# Patient Record
Sex: FEMALE | Race: BLACK OR AFRICAN AMERICAN | URBAN - METROPOLITAN AREA
[De-identification: names, ages, dates, MRNs, and addresses within clinical notes are randomized per-mention and may not be internally consistent; named-entity substitution may affect disease eponyms.]

---

## 2021-06-29 ENCOUNTER — OFFICE VISIT (OUTPATIENT)
Dept: URBAN - METROPOLITAN AREA TELEHEALTH 2 | Facility: TELEHEALTH | Age: 66
End: 2021-06-29

## 2021-11-09 ENCOUNTER — OFFICE VISIT (OUTPATIENT)
Dept: URBAN - METROPOLITAN AREA TELEHEALTH 2 | Facility: TELEHEALTH | Age: 66
End: 2021-11-09
Payer: MEDICARE

## 2021-11-09 DIAGNOSIS — R10.32 ABDOMINAL PAIN, LEFT LOWER QUADRANT: ICD-10-CM

## 2021-11-09 DIAGNOSIS — R10.84 ABDOMINAL CRAMPING, GENERALIZED: ICD-10-CM

## 2021-11-09 DIAGNOSIS — K57.32 DIVERTICULITIS OF COLON: ICD-10-CM

## 2021-11-09 PROCEDURE — 99213 OFFICE O/P EST LOW 20 MIN: CPT | Performed by: INTERNAL MEDICINE

## 2021-11-09 RX ORDER — FLUTICASONE PROPIONATE 50 UG/1
SPRAY, METERED NASAL
Qty: 0 | Refills: 0 | Status: ACTIVE | COMMUNITY
Start: 1900-01-01

## 2021-11-09 NOTE — HPI-TODAY'S VISIT:
Patient was last seen in our office on 4/30/2020.  She has had a history of recurrent diverticulitis.  She has had a history of multiple abdominal surgeries, with lysis of adhesions.  When last evaluated she was experiencing sudden onset of left lower quadrant abdominal pain.  She was evaluated in the emergency room, underwent abdominal pelvic CT scan showing an umbilical hernia, fatty liver diverticulosis without obvious inflammation.  Due to the persistence of the pain she was treated with a course of Cipro and Flagyl.  She underwent previous colonoscopy on 1/14/2019 which showed multiple sigmoid diverticuli, and was otherwise normal. Patient presented to her primary care physician on 11/1/2021 with left lower quadrant pain consistent with a flareup of her diverticulitis.  She was given a course of Cipro and Flagyl, and referred back to our practice for follow-up. Patient returns for follow-up on 11/9/2021.  She experienced a recent flareup of her diverticulitis, and was given a 10-day course of Cipro and Flagyl by her primary care physician.  She started feeling better after 3 days.  She is now feeling well with essentially no abdominal pain.  She does have mild constipation, and we advised her to use MiraLAX as needed.  She does drink plenty of fluids.  She wishes to avoid surgery if at all possible.  We discussed that since this was the first real flareup in over a year, avoiding surgery seems reasonable, especially as she does have significant adhesions due to previous multiple abdominal surgeries.  I advised her to avoid constipation, and to call us with the early onset of the symptoms, so antibiotic therapy can be started in a timely fashion.  As she had her last colonoscopy on 1/4/2019, showing multiple sigmoid diverticuli, we do not need to repeat colonoscopy at this time.

## 2022-08-10 ENCOUNTER — TELEPHONE ENCOUNTER (OUTPATIENT)
Dept: URBAN - METROPOLITAN AREA CLINIC 40 | Facility: CLINIC | Age: 67
End: 2022-08-10

## 2022-08-10 RX ORDER — CIPROFLOXACIN HYDROCHLORIDE 500 MG/1
TAKE 1 TABLET (500 MG) BY ORAL ROUTE EVERY 12 HOURS FOR 10 DAYS TABLET, FILM COATED ORAL 2
Qty: 20 | Refills: 1
Start: 2020-04-30

## 2022-08-10 RX ORDER — METRONIDAZOLE 500 MG/1
TAKE 1 TABLET (500 MG) BY ORAL ROUTE 3 TIMES PER DAY FOR 10 DAYS TABLET ORAL
Qty: 30 | Refills: 1
Start: 2020-04-30

## 2022-08-30 ENCOUNTER — OFFICE VISIT (OUTPATIENT)
Dept: URBAN - METROPOLITAN AREA CLINIC 40 | Facility: CLINIC | Age: 67
End: 2022-08-30
Payer: MEDICARE

## 2022-08-30 VITALS
BODY MASS INDEX: 34.66 KG/M2 | SYSTOLIC BLOOD PRESSURE: 135 MMHG | DIASTOLIC BLOOD PRESSURE: 80 MMHG | HEIGHT: 64 IN | HEART RATE: 88 BPM | TEMPERATURE: 97.6 F | OXYGEN SATURATION: 98 % | WEIGHT: 203 LBS

## 2022-08-30 DIAGNOSIS — K57.32 DIVERTICULITIS OF COLON: ICD-10-CM

## 2022-08-30 DIAGNOSIS — K21.9 GASTROESOPHAGEAL REFLUX DISEASE, UNSPECIFIED WHETHER ESOPHAGITIS PRESENT: ICD-10-CM

## 2022-08-30 DIAGNOSIS — R10.9 ABDOMINAL PAIN: ICD-10-CM

## 2022-08-30 DIAGNOSIS — K59.01 SLOW TRANSIT CONSTIPATION: ICD-10-CM

## 2022-08-30 DIAGNOSIS — R10.32 ABDOMINAL PAIN, LEFT LOWER QUADRANT: ICD-10-CM

## 2022-08-30 PROCEDURE — 99214 OFFICE O/P EST MOD 30 MIN: CPT | Performed by: INTERNAL MEDICINE

## 2022-08-30 RX ORDER — HYDROCHLOROTHIAZIDE 25 MG/1
1 TABLET IN THE MORNING TABLET ORAL ONCE A DAY
Status: ACTIVE | COMMUNITY

## 2022-08-30 RX ORDER — FAMOTIDINE 40 MG/1
1 TABLET AT BEDTIME TABLET, FILM COATED ORAL ONCE A DAY
Status: ACTIVE | COMMUNITY

## 2022-08-30 RX ORDER — METRONIDAZOLE 500 MG/1
TAKE 1 TABLET (500 MG) BY ORAL ROUTE 3 TIMES PER DAY FOR 10 DAYS TABLET ORAL
Qty: 30 | Refills: 1 | Status: ACTIVE | COMMUNITY
Start: 2020-04-30

## 2022-08-30 RX ORDER — PROMETHAZINE HYDROCHLORIDE 25 MG/ML
1 ML AS NEEDED INJECTION INTRAMUSCULAR; INTRAVENOUS
Status: ACTIVE | COMMUNITY

## 2022-08-30 RX ORDER — FLUTICASONE PROPIONATE 50 UG/1
SPRAY, METERED NASAL
Qty: 0 | Refills: 0 | Status: ACTIVE | COMMUNITY
Start: 1900-01-01

## 2022-08-30 RX ORDER — TRAZODONE HYDROCHLORIDE 100 MG/1
1 TABLET AT BEDTIME TABLET ORAL ONCE A DAY
Status: ACTIVE | COMMUNITY

## 2022-08-30 RX ORDER — CETIRIZINE HYDROCHLORIDE 10 MG/1
1 TABLET TABLET, FILM COATED ORAL ONCE A DAY
Status: ACTIVE | COMMUNITY

## 2022-08-30 RX ORDER — LOSARTAN POTASSIUM 100 MG/1
1 TABLET TABLET ORAL ONCE A DAY
Status: ACTIVE | COMMUNITY

## 2022-08-30 RX ORDER — ESZOPICLONE 2 MG/1
1 TABLET IMMEDIATELY BEFORE BEDTIME TABLET, FILM COATED ORAL ONCE A DAY
Status: ACTIVE | COMMUNITY

## 2022-08-30 RX ORDER — CIPROFLOXACIN HYDROCHLORIDE 500 MG/1
TAKE 1 TABLET (500 MG) BY ORAL ROUTE EVERY 12 HOURS FOR 10 DAYS TABLET, FILM COATED ORAL 2
Qty: 20 | Refills: 1 | Status: ACTIVE | COMMUNITY
Start: 2020-04-30

## 2022-08-30 RX ORDER — IBUPROFEN 800 MG/1
1 TABLET WITH FOOD OR MILK AS NEEDED TABLET, FILM COATED ORAL
Status: ACTIVE | COMMUNITY

## 2022-08-30 NOTE — PHYSICAL EXAM CONSTITUTIONAL:
well developed, well nourished , in no acute distress , ambulating without difficulty , normal communication ability [phone]

## 2022-08-30 NOTE — HPI-TODAY'S VISIT:
Patient was last seen in our office on 4/30/2020.  She has had a history of recurrent diverticulitis.  She has had a history of multiple abdominal surgeries, with lysis of adhesions.  When last evaluated she was experiencing sudden onset of left lower quadrant abdominal pain.  She was evaluated in the emergency room, underwent abdominal pelvic CT scan showing an umbilical hernia, fatty liver diverticulosis without obvious inflammation.  Due to the persistence of the pain she was treated with a course of Cipro and Flagyl.  She underwent previous colonoscopy on 1/14/2019 which showed multiple sigmoid diverticuli, and was otherwise normal. Patient presented to her primary care physician on 11/1/2021 with left lower quadrant pain consistent with a flareup of her diverticulitis.  She was given a course of Cipro and Flagyl, and referred back to our practice for follow-up. Patient returns for follow-up on 11/9/2021.  She experienced a recent flareup of her diverticulitis, and was given a 10-day course of Cipro and Flagyl by her primary care physician.  She started feeling better after 3 days.  She is now feeling well with essentially no abdominal pain.  She does have mild constipation, and we advised her to use MiraLAX as needed.  She does drink plenty of fluids.  She wishes to avoid surgery if at all possible.  We discussed that since this was the first real flareup in over a year, avoiding surgery seems reasonable, especially as she does have significant adhesions due to previous multiple abdominal surgeries.  I advised her to avoid constipation, and to call us with the early onset of the symptoms, so antibiotic therapy can be started in a timely fashion.  As she had her last colonoscopy on 1/4/2019, showing multiple sigmoid diverticuli, we do not need to repeat colonoscopy at this time. Patient has had a recent flareup of diverticulitis, and underwent abdominal pelvic CT scan on 8/12/2022 findings are consistent with an acute diverticulitis involving the distal descending colon, proximal sigmoid colon Patient returns for follow-up on 8/30/2022.  Several weeks ago she experienced another flareup of diverticulitis with left lower quadrant abdominal pain.  She underwent abdominal pelvic CT scan which was consistent with an acute diverticulitis involving the distal descending colon and proximal sigmoid colon.  She was given a 10-day course of Cipro and Flagyl, and her symptoms have completely resolved.  She is having no further abdominal pain, and her bowel movements are now normal.  She denies any overt bleeding, has had no unexplained weight loss.  She believes she was mildly constipated prior to this episode, and I again emphasized that she would benefit by beginning MiraLAX when she notices the onset of even a mild constipation.  She does drink plenty of fluids.  She has had mild gastroesophageal reflux, and was begun on famotidine 40 mg p.o. nightly by Dr. Fox.  Her symptoms have improved, and she has no swallowing issues.

## 2022-11-02 ENCOUNTER — P2P PATIENT RECORD (OUTPATIENT)
Age: 67
End: 2022-11-02

## 2022-11-10 ENCOUNTER — LAB OUTSIDE AN ENCOUNTER (OUTPATIENT)
Dept: URBAN - METROPOLITAN AREA CLINIC 40 | Facility: CLINIC | Age: 67
End: 2022-11-10

## 2022-11-10 ENCOUNTER — OFFICE VISIT (OUTPATIENT)
Dept: URBAN - METROPOLITAN AREA CLINIC 40 | Facility: CLINIC | Age: 67
End: 2022-11-10
Payer: MEDICARE

## 2022-11-10 VITALS
BODY MASS INDEX: 34.31 KG/M2 | HEART RATE: 78 BPM | HEIGHT: 64 IN | SYSTOLIC BLOOD PRESSURE: 128 MMHG | DIASTOLIC BLOOD PRESSURE: 78 MMHG | TEMPERATURE: 97.3 F | WEIGHT: 201 LBS

## 2022-11-10 DIAGNOSIS — R10.9 ABDOMINAL PAIN: ICD-10-CM

## 2022-11-10 DIAGNOSIS — R10.32 ABDOMINAL PAIN, LEFT LOWER QUADRANT: ICD-10-CM

## 2022-11-10 DIAGNOSIS — K21.9 GASTROESOPHAGEAL REFLUX DISEASE, UNSPECIFIED WHETHER ESOPHAGITIS PRESENT: ICD-10-CM

## 2022-11-10 DIAGNOSIS — K57.32 DIVERTICULITIS OF COLON: ICD-10-CM

## 2022-11-10 DIAGNOSIS — K59.01 SLOW TRANSIT CONSTIPATION: ICD-10-CM

## 2022-11-10 PROCEDURE — 99214 OFFICE O/P EST MOD 30 MIN: CPT | Performed by: INTERNAL MEDICINE

## 2022-11-10 RX ORDER — FAMOTIDINE 40 MG/1
1 TABLET AT BEDTIME TABLET, FILM COATED ORAL ONCE A DAY
Status: ACTIVE | COMMUNITY

## 2022-11-10 RX ORDER — HYDROCHLOROTHIAZIDE 25 MG/1
1 TABLET IN THE MORNING TABLET ORAL ONCE A DAY
Status: ACTIVE | COMMUNITY

## 2022-11-10 RX ORDER — DICYCLOMINE HYDROCHLORIDE 20 MG/1
1 TABLET TABLET ORAL THREE TIMES A DAY
Qty: 270 TABLET | Refills: 3 | OUTPATIENT

## 2022-11-10 RX ORDER — PROMETHAZINE HYDROCHLORIDE 25 MG/ML
1 ML AS NEEDED INJECTION INTRAMUSCULAR; INTRAVENOUS
Status: ACTIVE | COMMUNITY

## 2022-11-10 RX ORDER — METRONIDAZOLE 500 MG/1
TAKE 1 TABLET (500 MG) BY ORAL ROUTE 3 TIMES PER DAY FOR 10 DAYS TABLET ORAL
Qty: 30 | Refills: 1 | Status: ACTIVE | COMMUNITY
Start: 2020-04-30

## 2022-11-10 RX ORDER — ESZOPICLONE 2 MG/1
1 TABLET IMMEDIATELY BEFORE BEDTIME TABLET, FILM COATED ORAL ONCE A DAY
Status: ACTIVE | COMMUNITY

## 2022-11-10 RX ORDER — TRAZODONE HYDROCHLORIDE 100 MG/1
1 TABLET AT BEDTIME TABLET ORAL ONCE A DAY
Status: ACTIVE | COMMUNITY

## 2022-11-10 RX ORDER — CIPROFLOXACIN HYDROCHLORIDE 500 MG/1
TAKE 1 TABLET (500 MG) BY ORAL ROUTE EVERY 12 HOURS FOR 10 DAYS TABLET, FILM COATED ORAL 2
Qty: 20 | Refills: 1 | Status: ACTIVE | COMMUNITY
Start: 2020-04-30

## 2022-11-10 RX ORDER — FLUTICASONE PROPIONATE 50 UG/1
SPRAY, METERED NASAL
Qty: 0 | Refills: 0 | Status: ACTIVE | COMMUNITY
Start: 1900-01-01

## 2022-11-10 RX ORDER — LOSARTAN POTASSIUM 100 MG/1
1 TABLET TABLET ORAL ONCE A DAY
Status: ACTIVE | COMMUNITY

## 2022-11-10 RX ORDER — IBUPROFEN 800 MG/1
1 TABLET WITH FOOD OR MILK AS NEEDED TABLET, FILM COATED ORAL
Status: ACTIVE | COMMUNITY

## 2022-11-10 RX ORDER — CETIRIZINE HYDROCHLORIDE 10 MG/1
1 TABLET TABLET, FILM COATED ORAL ONCE A DAY
Status: ACTIVE | COMMUNITY

## 2022-11-10 NOTE — PHYSICAL EXAM GASTROINTESTINAL
Abdomen , soft,  mildly tender, LLQ,  nondistended , no guarding or rigidity , no masses palpable , normal bowel sounds , Liver and Spleen,  no hepatosplenomegaly , liver nontender

## 2023-01-08 PROBLEM — 35298007: Status: ACTIVE | Noted: 2022-08-30

## 2023-01-08 PROBLEM — 235595009: Status: ACTIVE | Noted: 2022-08-30

## 2023-01-12 ENCOUNTER — OFFICE VISIT (OUTPATIENT)
Dept: URBAN - METROPOLITAN AREA CLINIC 40 | Facility: CLINIC | Age: 68
End: 2023-01-12
Payer: MEDICARE

## 2023-01-12 VITALS
TEMPERATURE: 97.3 F | SYSTOLIC BLOOD PRESSURE: 122 MMHG | DIASTOLIC BLOOD PRESSURE: 82 MMHG | HEART RATE: 78 BPM | BODY MASS INDEX: 34.83 KG/M2 | WEIGHT: 204 LBS | HEIGHT: 64 IN

## 2023-01-12 DIAGNOSIS — K59.01 SLOW TRANSIT CONSTIPATION: ICD-10-CM

## 2023-01-12 DIAGNOSIS — R10.32 ABDOMINAL PAIN, LEFT LOWER QUADRANT: ICD-10-CM

## 2023-01-12 DIAGNOSIS — K57.32 DIVERTICULITIS OF COLON: ICD-10-CM

## 2023-01-12 DIAGNOSIS — K21.9 GASTROESOPHAGEAL REFLUX DISEASE, UNSPECIFIED WHETHER ESOPHAGITIS PRESENT: ICD-10-CM

## 2023-01-12 DIAGNOSIS — R10.9 ABDOMINAL PAIN: ICD-10-CM

## 2023-01-12 PROCEDURE — 99214 OFFICE O/P EST MOD 30 MIN: CPT | Performed by: INTERNAL MEDICINE

## 2023-01-12 RX ORDER — METRONIDAZOLE 500 MG/1
TAKE 1 TABLET (500 MG) BY ORAL ROUTE 3 TIMES PER DAY FOR 10 DAYS TABLET ORAL
Qty: 30 | Refills: 1 | Status: ACTIVE | COMMUNITY
Start: 2020-04-30

## 2023-01-12 RX ORDER — PROMETHAZINE HYDROCHLORIDE 25 MG/ML
1 ML AS NEEDED INJECTION INTRAMUSCULAR; INTRAVENOUS
Status: ACTIVE | COMMUNITY

## 2023-01-12 RX ORDER — HYDROCHLOROTHIAZIDE 25 MG/1
1 TABLET IN THE MORNING TABLET ORAL ONCE A DAY
Status: ACTIVE | COMMUNITY

## 2023-01-12 RX ORDER — FAMOTIDINE 40 MG/1
1 TABLET AT BEDTIME TABLET, FILM COATED ORAL ONCE A DAY
Qty: 90 TABLET | Refills: 3

## 2023-01-12 RX ORDER — IBUPROFEN 800 MG/1
1 TABLET WITH FOOD OR MILK AS NEEDED TABLET, FILM COATED ORAL
Status: ACTIVE | COMMUNITY

## 2023-01-12 RX ORDER — CIPROFLOXACIN HYDROCHLORIDE 500 MG/1
TAKE 1 TABLET (500 MG) BY ORAL ROUTE EVERY 12 HOURS FOR 10 DAYS TABLET, FILM COATED ORAL 2
Qty: 20 | Refills: 1 | Status: ACTIVE | COMMUNITY
Start: 2020-04-30

## 2023-01-12 RX ORDER — TRAZODONE HYDROCHLORIDE 100 MG/1
1 TABLET AT BEDTIME TABLET ORAL ONCE A DAY
Status: ACTIVE | COMMUNITY

## 2023-01-12 RX ORDER — CETIRIZINE HYDROCHLORIDE 10 MG/1
1 TABLET TABLET, FILM COATED ORAL ONCE A DAY
Status: ACTIVE | COMMUNITY

## 2023-01-12 RX ORDER — DICYCLOMINE HYDROCHLORIDE 20 MG/1
1 TABLET TABLET ORAL THREE TIMES A DAY
Qty: 270 TABLET | Refills: 3 | Status: ACTIVE | COMMUNITY

## 2023-01-12 RX ORDER — FAMOTIDINE 40 MG/1
1 TABLET AT BEDTIME TABLET, FILM COATED ORAL ONCE A DAY
Status: ACTIVE | COMMUNITY

## 2023-01-12 RX ORDER — LOSARTAN POTASSIUM 100 MG/1
1 TABLET TABLET ORAL ONCE A DAY
Status: ACTIVE | COMMUNITY

## 2023-01-12 RX ORDER — DICYCLOMINE HYDROCHLORIDE 20 MG/1
1 TABLET TABLET ORAL THREE TIMES A DAY
Qty: 270 TABLET | Refills: 3 | OUTPATIENT

## 2023-01-12 RX ORDER — ESZOPICLONE 2 MG/1
1 TABLET IMMEDIATELY BEFORE BEDTIME TABLET, FILM COATED ORAL ONCE A DAY
Status: ACTIVE | COMMUNITY

## 2023-01-12 RX ORDER — FLUTICASONE PROPIONATE 50 UG/1
SPRAY, METERED NASAL
Qty: 0 | Refills: 0 | Status: ACTIVE | COMMUNITY
Start: 1900-01-01

## 2023-01-12 NOTE — HPI-TODAY'S VISIT:
Patient was last seen in our office on 4/30/2020.  She has had a history of recurrent diverticulitis.  She has had a history of multiple abdominal surgeries, with lysis of adhesions.  When last evaluated she was experiencing sudden onset of left lower quadrant abdominal pain.  She was evaluated in the emergency room, underwent abdominal pelvic CT scan showing an umbilical hernia, fatty liver diverticulosis without obvious inflammation.  Due to the persistence of the pain she was treated with a course of Cipro and Flagyl.  She underwent previous colonoscopy on 1/14/2019 which showed multiple sigmoid diverticuli, and was otherwise normal. Patient presented to her primary care physician on 11/1/2021 with left lower quadrant pain consistent with a flareup of her diverticulitis.  She was given a course of Cipro and Flagyl, and referred back to our practice for follow-up. Patient returns for follow-up on 11/9/2021.  She experienced a recent flareup of her diverticulitis, and was given a 10-day course of Cipro and Flagyl by her primary care physician.  She started feeling better after 3 days.  She is now feeling well with essentially no abdominal pain.  She does have mild constipation, and we advised her to use MiraLAX as needed.  She does drink plenty of fluids.  She wishes to avoid surgery if at all possible.  We discussed that since this was the first real flareup in over a year, avoiding surgery seems reasonable, especially as she does have significant adhesions due to previous multiple abdominal surgeries.  I advised her to avoid constipation, and to call us with the early onset of the symptoms, so antibiotic therapy can be started in a timely fashion.  As she had her last colonoscopy on 1/4/2019, showing multiple sigmoid diverticuli, we do not need to repeat colonoscopy at this time. Patient has had a recent flareup of diverticulitis, and underwent abdominal pelvic CT scan on 8/12/2022 findings are consistent with an acute diverticulitis involving the distal descending colon, proximal sigmoid colon Patient returns for follow-up on 8/30/2022.  Several weeks ago she experienced another flareup of diverticulitis with left lower quadrant abdominal pain.  She underwent abdominal pelvic CT scan which was consistent with an acute diverticulitis involving the distal descending colon and proximal sigmoid colon.  She was given a 10-day course of Cipro and Flagyl, and her symptoms have completely resolved.  She is having no further abdominal pain, and her bowel movements are now normal.  She denies any overt bleeding, has had no unexplained weight loss.  She believes she was mildly constipated prior to this episode, and I again emphasized that she would benefit by beginning MiraLAX when she notices the onset of even a mild constipation.  She does drink plenty of fluids.  She has had mild gastroesophageal reflux, and was begun on famotidine 40 mg p.o. nightly by Dr. Fox.  Her symptoms have improved, and she has no swallowing issues. Patient returns for follow-up on 11/10/2022.  She states that she has had another recent flareup of diverticulitis with left lower quadrant pain and loose stools.  She was placed on Cipro and Flagyl by her primary care physician, and states that her bowel movements are more normal, but her left lower quadrant pain has persisted.  In addition she has had a flareup of her reflux.  Pantoprazole 40 mg p.o. every morning was added to her regimen which included famotidine 40 mg at bedtime.  She has been elevating the head of her bed.  She states that her reflux has also persisted, she is having no swallowing issues.  She denies any overt bleeding, has had no unexplained weight loss. Patient underwent lab work on 12/12/2022 which included CBC with hematocrit 35.9 hemoglobin 11.1 WBC 8.4 and normal iron studies.  She underwent abdominal pelvic CT scan on 12/20/2022 which showed extensive diverticulosis, but resolution of the previously seen acute diverticulitis. Patient returns for follow-up on 1/12/2023.  Overall she is doing well from a GI standpoint, and having no significant abdominal pain at this time.  We reviewed the results of her recent abdominal pelvic CT scan, which showed extensive diverticulosis, but resolution of the previously seen acute diverticulitis.  Her bowel movements have been essentially normal.  Her reflux has been under good control on daily famotidine therapy, she is discontinued the pantoprazole.  She is taking Benefiber.  She is having no swallowing issues.  She did eat a bowl of blueberries which cause mild abdominal discomfort, but this has resolved.  We again discussed in detail our goal of medically treating her diverticulitis, as she would be a difficult surgical candidate with extensive diverticulosis, and significant adhesions due to previous multiple abdominal surgeries.

## 2023-07-13 ENCOUNTER — OFFICE VISIT (OUTPATIENT)
Dept: URBAN - METROPOLITAN AREA CLINIC 40 | Facility: CLINIC | Age: 68
End: 2023-07-13

## 2023-07-13 RX ORDER — FLUTICASONE PROPIONATE 50 UG/1
SPRAY, METERED NASAL
Qty: 0 | Refills: 0 | Status: ACTIVE | COMMUNITY
Start: 1900-01-01

## 2023-07-13 RX ORDER — DICYCLOMINE HYDROCHLORIDE 20 MG/1
1 TABLET TABLET ORAL THREE TIMES A DAY
Qty: 270 TABLET | Refills: 3 | OUTPATIENT

## 2023-07-13 RX ORDER — FAMOTIDINE 40 MG/1
1 TABLET AT BEDTIME TABLET, FILM COATED ORAL ONCE A DAY
Qty: 90 TABLET | Refills: 3 | Status: ACTIVE | COMMUNITY

## 2023-07-13 RX ORDER — HYDROCHLOROTHIAZIDE 25 MG/1
1 TABLET IN THE MORNING TABLET ORAL ONCE A DAY
Status: ACTIVE | COMMUNITY

## 2023-07-13 RX ORDER — CETIRIZINE HYDROCHLORIDE 10 MG/1
1 TABLET TABLET, FILM COATED ORAL ONCE A DAY
Status: ACTIVE | COMMUNITY

## 2023-07-13 RX ORDER — IBUPROFEN 800 MG/1
1 TABLET WITH FOOD OR MILK AS NEEDED TABLET, FILM COATED ORAL
Status: ACTIVE | COMMUNITY

## 2023-07-13 RX ORDER — DICYCLOMINE HYDROCHLORIDE 20 MG/1
1 TABLET TABLET ORAL THREE TIMES A DAY
Qty: 270 TABLET | Refills: 3 | Status: ACTIVE | COMMUNITY

## 2023-07-13 RX ORDER — ESZOPICLONE 2 MG/1
1 TABLET IMMEDIATELY BEFORE BEDTIME TABLET, FILM COATED ORAL ONCE A DAY
Status: ACTIVE | COMMUNITY

## 2023-07-13 RX ORDER — CIPROFLOXACIN HYDROCHLORIDE 500 MG/1
TAKE 1 TABLET (500 MG) BY ORAL ROUTE EVERY 12 HOURS FOR 10 DAYS TABLET, FILM COATED ORAL 2
Qty: 20 | Refills: 1 | Status: ACTIVE | COMMUNITY
Start: 2020-04-30

## 2023-07-13 RX ORDER — LOSARTAN POTASSIUM 100 MG/1
1 TABLET TABLET ORAL ONCE A DAY
Status: ACTIVE | COMMUNITY

## 2023-07-13 RX ORDER — PROMETHAZINE HYDROCHLORIDE 25 MG/ML
1 ML AS NEEDED INJECTION INTRAMUSCULAR; INTRAVENOUS
Status: ACTIVE | COMMUNITY

## 2023-07-13 RX ORDER — METRONIDAZOLE 500 MG/1
TAKE 1 TABLET (500 MG) BY ORAL ROUTE 3 TIMES PER DAY FOR 10 DAYS TABLET ORAL
Qty: 30 | Refills: 1 | Status: ACTIVE | COMMUNITY
Start: 2020-04-30

## 2023-07-13 RX ORDER — TRAZODONE HYDROCHLORIDE 100 MG/1
1 TABLET AT BEDTIME TABLET ORAL ONCE A DAY
Status: ACTIVE | COMMUNITY

## 2023-10-05 ENCOUNTER — OFFICE VISIT (OUTPATIENT)
Dept: URBAN - METROPOLITAN AREA CLINIC 40 | Facility: CLINIC | Age: 68
End: 2023-10-05
Payer: MEDICARE

## 2023-10-05 VITALS
BODY MASS INDEX: 35.34 KG/M2 | DIASTOLIC BLOOD PRESSURE: 70 MMHG | HEIGHT: 64 IN | HEART RATE: 76 BPM | WEIGHT: 207 LBS | SYSTOLIC BLOOD PRESSURE: 130 MMHG | TEMPERATURE: 97.5 F

## 2023-10-05 DIAGNOSIS — K59.01 SLOW TRANSIT CONSTIPATION: ICD-10-CM

## 2023-10-05 DIAGNOSIS — K57.32 DIVERTICULITIS OF COLON: ICD-10-CM

## 2023-10-05 DIAGNOSIS — R10.9 ABDOMINAL PAIN: ICD-10-CM

## 2023-10-05 DIAGNOSIS — R10.32 ABDOMINAL PAIN, LEFT LOWER QUADRANT: ICD-10-CM

## 2023-10-05 DIAGNOSIS — K21.9 GASTROESOPHAGEAL REFLUX DISEASE, UNSPECIFIED WHETHER ESOPHAGITIS PRESENT: ICD-10-CM

## 2023-10-05 PROCEDURE — 99214 OFFICE O/P EST MOD 30 MIN: CPT | Performed by: INTERNAL MEDICINE

## 2023-10-05 RX ORDER — IBUPROFEN 800 MG/1
1 TABLET WITH FOOD OR MILK AS NEEDED TABLET, FILM COATED ORAL
Status: ACTIVE | COMMUNITY

## 2023-10-05 RX ORDER — DICYCLOMINE HYDROCHLORIDE 20 MG/1
1 TABLET TABLET ORAL THREE TIMES A DAY
Qty: 270 TABLET | Refills: 3 | Status: ACTIVE | COMMUNITY

## 2023-10-05 RX ORDER — PANTOPRAZOLE SODIUM 40 MG/1
1 TABLET TABLET, DELAYED RELEASE ORAL ONCE A DAY
Qty: 90 TABLET | Refills: 3 | OUTPATIENT
Start: 2023-10-05

## 2023-10-05 RX ORDER — LOSARTAN POTASSIUM 100 MG/1
1 TABLET TABLET ORAL ONCE A DAY
Status: ACTIVE | COMMUNITY

## 2023-10-05 RX ORDER — DICYCLOMINE HYDROCHLORIDE 20 MG/1
1 TABLET TABLET ORAL THREE TIMES A DAY
Qty: 270 TABLET | Refills: 3 | OUTPATIENT

## 2023-10-05 RX ORDER — CETIRIZINE HYDROCHLORIDE 10 MG/1
1 TABLET TABLET, FILM COATED ORAL ONCE A DAY
Status: ACTIVE | COMMUNITY

## 2023-10-05 RX ORDER — FAMOTIDINE 40 MG/1
1 TABLET AT BEDTIME TABLET, FILM COATED ORAL ONCE A DAY
Qty: 90 TABLET | Refills: 3 | Status: ACTIVE | COMMUNITY

## 2023-10-05 RX ORDER — METRONIDAZOLE 500 MG/1
TAKE 1 TABLET (500 MG) BY ORAL ROUTE 3 TIMES PER DAY FOR 10 DAYS TABLET ORAL
Qty: 30 | Refills: 1 | Status: ACTIVE | COMMUNITY
Start: 2020-04-30

## 2023-10-05 RX ORDER — FLUTICASONE PROPIONATE 50 UG/1
SPRAY, METERED NASAL
Qty: 0 | Refills: 0 | Status: ACTIVE | COMMUNITY
Start: 1900-01-01

## 2023-10-05 RX ORDER — CIPROFLOXACIN HYDROCHLORIDE 500 MG/1
TAKE 1 TABLET (500 MG) BY ORAL ROUTE EVERY 12 HOURS FOR 10 DAYS TABLET, FILM COATED ORAL 2
Qty: 20 | Refills: 1 | Status: ACTIVE | COMMUNITY
Start: 2020-04-30

## 2023-10-05 RX ORDER — HYDROCHLOROTHIAZIDE 25 MG/1
1 TABLET IN THE MORNING TABLET ORAL ONCE A DAY
Status: ACTIVE | COMMUNITY

## 2023-10-05 RX ORDER — PROMETHAZINE HYDROCHLORIDE 25 MG/ML
1 ML AS NEEDED INJECTION INTRAMUSCULAR; INTRAVENOUS
Status: ACTIVE | COMMUNITY

## 2023-10-05 RX ORDER — ESZOPICLONE 2 MG/1
1 TABLET IMMEDIATELY BEFORE BEDTIME TABLET, FILM COATED ORAL ONCE A DAY
Status: ACTIVE | COMMUNITY

## 2023-10-05 RX ORDER — TRAZODONE HYDROCHLORIDE 100 MG/1
1 TABLET AT BEDTIME TABLET ORAL ONCE A DAY
Status: ACTIVE | COMMUNITY

## 2023-10-05 NOTE — HPI-TODAY'S VISIT:
Patient was last seen in our office on 4/30/2020.  She has had a history of recurrent diverticulitis.  She has had a history of multiple abdominal surgeries, with lysis of adhesions.  When last evaluated she was experiencing sudden onset of left lower quadrant abdominal pain.  She was evaluated in the emergency room, underwent abdominal pelvic CT scan showing an umbilical hernia, fatty liver diverticulosis without obvious inflammation.  Due to the persistence of the pain she was treated with a course of Cipro and Flagyl.  She underwent previous colonoscopy on 1/14/2019 which showed multiple sigmoid diverticuli, and was otherwise normal. Patient presented to her primary care physician on 11/1/2021 with left lower quadrant pain consistent with a flareup of her diverticulitis.  She was given a course of Cipro and Flagyl, and referred back to our practice for follow-up. Patient returns for follow-up on 11/9/2021.  She experienced a recent flareup of her diverticulitis, and was given a 10-day course of Cipro and Flagyl by her primary care physician.  She started feeling better after 3 days.  She is now feeling well with essentially no abdominal pain.  She does have mild constipation, and we advised her to use MiraLAX as needed.  She does drink plenty of fluids.  She wishes to avoid surgery if at all possible.  We discussed that since this was the first real flareup in over a year, avoiding surgery seems reasonable, especially as she does have significant adhesions due to previous multiple abdominal surgeries.  I advised her to avoid constipation, and to call us with the early onset of the symptoms, so antibiotic therapy can be started in a timely fashion.  As she had her last colonoscopy on 1/4/2019, showing multiple sigmoid diverticuli, we do not need to repeat colonoscopy at this time. Patient has had a recent flareup of diverticulitis, and underwent abdominal pelvic CT scan on 8/12/2022 findings are consistent with an acute diverticulitis involving the distal descending colon, proximal sigmoid colon Patient returns for follow-up on 8/30/2022.  Several weeks ago she experienced another flareup of diverticulitis with left lower quadrant abdominal pain.  She underwent abdominal pelvic CT scan which was consistent with an acute diverticulitis involving the distal descending colon and proximal sigmoid colon.  She was given a 10-day course of Cipro and Flagyl, and her symptoms have completely resolved.  She is having no further abdominal pain, and her bowel movements are now normal.  She denies any overt bleeding, has had no unexplained weight loss.  She believes she was mildly constipated prior to this episode, and I again emphasized that she would benefit by beginning MiraLAX when she notices the onset of even a mild constipation.  She does drink plenty of fluids.  She has had mild gastroesophageal reflux, and was begun on famotidine 40 mg p.o. nightly by Dr. Fox.  Her symptoms have improved, and she has no swallowing issues. Patient returns for follow-up on 11/10/2022.  She states that she has had another recent flareup of diverticulitis with left lower quadrant pain and loose stools.  She was placed on Cipro and Flagyl by her primary care physician, and states that her bowel movements are more normal, but her left lower quadrant pain has persisted.  In addition she has had a flareup of her reflux.  Pantoprazole 40 mg p.o. every morning was added to her regimen which included famotidine 40 mg at bedtime.  She has been elevating the head of her bed.  She states that her reflux has also persisted, she is having no swallowing issues.  She denies any overt bleeding, has had no unexplained weight loss. Patient underwent lab work on 12/12/2022 which included CBC with hematocrit 35.9 hemoglobin 11.1 WBC 8.4 and normal iron studies.  She underwent abdominal pelvic CT scan on 12/20/2022 which showed extensive diverticulosis, but resolution of the previously seen acute diverticulitis. Patient returns for follow-up on 1/12/2023.  Overall she is doing well from a GI standpoint, and having no significant abdominal pain at this time.  We reviewed the results of her recent abdominal pelvic CT scan, which showed extensive diverticulosis, but resolution of the previously seen acute diverticulitis.  Her bowel movements have been essentially normal.  Her reflux has been under good control on daily famotidine therapy, she is discontinued the pantoprazole.  She is taking Benefiber.  She is having no swallowing issues.  She did eat a bowl of blueberries which cause mild abdominal discomfort, but this has resolved.  We again discussed in detail our goal of medically treating her diverticulitis, as she would be a difficult surgical candidate with extensive diverticulosis, and significant adhesions due to previous multiple abdominal surgeries. Patient returns for follow-up on 10/5/2023.  Since her last visit she has had no episodes of diverticulitis, and will occasionally have mild abdominal discomfort overall her bowel movements have been normal she has noticed an increase in her reflux symptoms and that she has had some coughing after meals and occasionally dry food will stick in her throat she denies any actual ongoing dysphagia to solid foods, and she does experience allergies.  She has had no unusual weight loss, no overt GI bleeding and overall has been feeling well from a GI standpoint.

## 2024-04-18 ENCOUNTER — OV EP (OUTPATIENT)
Dept: URBAN - METROPOLITAN AREA CLINIC 40 | Facility: CLINIC | Age: 69
End: 2024-04-18
Payer: MEDICARE

## 2024-04-18 VITALS
SYSTOLIC BLOOD PRESSURE: 126 MMHG | BODY MASS INDEX: 35.07 KG/M2 | HEIGHT: 64 IN | DIASTOLIC BLOOD PRESSURE: 72 MMHG | HEART RATE: 75 BPM | WEIGHT: 205.4 LBS | TEMPERATURE: 97.5 F

## 2024-04-18 DIAGNOSIS — K57.32 DIVERTICULITIS OF COLON: ICD-10-CM

## 2024-04-18 DIAGNOSIS — R10.32 ABDOMINAL PAIN, LEFT LOWER QUADRANT: ICD-10-CM

## 2024-04-18 DIAGNOSIS — K21.9 GASTROESOPHAGEAL REFLUX DISEASE, UNSPECIFIED WHETHER ESOPHAGITIS PRESENT: ICD-10-CM

## 2024-04-18 DIAGNOSIS — R10.9 ABDOMINAL PAIN: ICD-10-CM

## 2024-04-18 DIAGNOSIS — K59.01 SLOW TRANSIT CONSTIPATION: ICD-10-CM

## 2024-04-18 PROCEDURE — 99214 OFFICE O/P EST MOD 30 MIN: CPT | Performed by: INTERNAL MEDICINE

## 2024-04-18 RX ORDER — DICYCLOMINE HYDROCHLORIDE 20 MG/1
1 TABLET TABLET ORAL THREE TIMES A DAY
Qty: 270 TABLET | Refills: 3 | OUTPATIENT

## 2024-04-18 RX ORDER — HYDROCHLOROTHIAZIDE 25 MG/1
1 TABLET IN THE MORNING TABLET ORAL ONCE A DAY
Status: ACTIVE | COMMUNITY

## 2024-04-18 RX ORDER — PROMETHAZINE HYDROCHLORIDE 25 MG/ML
1 ML AS NEEDED INJECTION INTRAMUSCULAR; INTRAVENOUS
Status: ON HOLD | COMMUNITY

## 2024-04-18 RX ORDER — FLUTICASONE PROPIONATE 50 UG/1
SPRAY, METERED NASAL
Qty: 0 | Refills: 0 | Status: ACTIVE | COMMUNITY
Start: 1900-01-01

## 2024-04-18 RX ORDER — METRONIDAZOLE 500 MG/1
TAKE 1 TABLET (500 MG) BY ORAL ROUTE 3 TIMES PER DAY FOR 10 DAYS TABLET ORAL
Qty: 30 | Refills: 1 | Status: ON HOLD | COMMUNITY
Start: 2020-04-30

## 2024-04-18 RX ORDER — CIPROFLOXACIN HYDROCHLORIDE 500 MG/1
TAKE 1 TABLET (500 MG) BY ORAL ROUTE EVERY 12 HOURS FOR 10 DAYS TABLET, FILM COATED ORAL 2
Qty: 20 | Refills: 1 | Status: ON HOLD | COMMUNITY
Start: 2020-04-30

## 2024-04-18 RX ORDER — FAMOTIDINE 40 MG/1
TAKE 1 TABLET BY MOUTH EVERY DAY AT BEDTIME TABLET, FILM COATED ORAL
Qty: 90 TABLET | Refills: 3 | Status: ACTIVE | COMMUNITY

## 2024-04-18 RX ORDER — TRAZODONE HYDROCHLORIDE 100 MG/1
1 TABLET AT BEDTIME TABLET ORAL ONCE A DAY
Status: ACTIVE | COMMUNITY

## 2024-04-18 RX ORDER — CETIRIZINE HYDROCHLORIDE 10 MG/1
1 TABLET TABLET, FILM COATED ORAL ONCE A DAY
Status: ACTIVE | COMMUNITY

## 2024-04-18 RX ORDER — PANTOPRAZOLE SODIUM 40 MG/1
1 TABLET TABLET, DELAYED RELEASE ORAL ONCE A DAY
Qty: 90 TABLET | Refills: 3 | OUTPATIENT

## 2024-04-18 RX ORDER — LOSARTAN POTASSIUM 100 MG/1
1 TABLET TABLET ORAL ONCE A DAY
Status: ACTIVE | COMMUNITY

## 2024-04-18 RX ORDER — IBUPROFEN 800 MG/1
1 TABLET WITH FOOD OR MILK AS NEEDED TABLET, FILM COATED ORAL
Status: ACTIVE | COMMUNITY

## 2024-04-18 RX ORDER — PANTOPRAZOLE SODIUM 40 MG/1
1 TABLET TABLET, DELAYED RELEASE ORAL ONCE A DAY
Qty: 90 TABLET | Refills: 3 | Status: ACTIVE | COMMUNITY
Start: 2023-10-05

## 2024-04-18 RX ORDER — DICYCLOMINE HYDROCHLORIDE 20 MG/1
1 TABLET TABLET ORAL THREE TIMES A DAY
Qty: 270 TABLET | Refills: 3 | Status: ACTIVE | COMMUNITY

## 2024-04-18 RX ORDER — ESZOPICLONE 2 MG/1
1 TABLET IMMEDIATELY BEFORE BEDTIME TABLET, FILM COATED ORAL ONCE A DAY
Status: ACTIVE | COMMUNITY

## 2024-04-18 NOTE — HPI-TODAY'S VISIT:
Patient was last seen in our office on 4/30/2020.  She has had a history of recurrent diverticulitis.  She has had a history of multiple abdominal surgeries, with lysis of adhesions.  When last evaluated she was experiencing sudden onset of left lower quadrant abdominal pain.  She was evaluated in the emergency room, underwent abdominal pelvic CT scan showing an umbilical hernia, fatty liver diverticulosis without obvious inflammation.  Due to the persistence of the pain she was treated with a course of Cipro and Flagyl.  She underwent previous colonoscopy on 1/14/2019 which showed multiple sigmoid diverticuli, and was otherwise normal. Patient presented to her primary care physician on 11/1/2021 with left lower quadrant pain consistent with a flareup of her diverticulitis.  She was given a course of Cipro and Flagyl, and referred back to our practice for follow-up. Patient returns for follow-up on 11/9/2021.  She experienced a recent flareup of her diverticulitis, and was given a 10-day course of Cipro and Flagyl by her primary care physician.  She started feeling better after 3 days.  She is now feeling well with essentially no abdominal pain.  She does have mild constipation, and we advised her to use MiraLAX as needed.  She does drink plenty of fluids.  She wishes to avoid surgery if at all possible.  We discussed that since this was the first real flareup in over a year, avoiding surgery seems reasonable, especially as she does have significant adhesions due to previous multiple abdominal surgeries.  I advised her to avoid constipation, and to call us with the early onset of the symptoms, so antibiotic therapy can be started in a timely fashion.  As she had her last colonoscopy on 1/4/2019, showing multiple sigmoid diverticuli, we do not need to repeat colonoscopy at this time. Patient has had a recent flareup of diverticulitis, and underwent abdominal pelvic CT scan on 8/12/2022 findings are consistent with an acute diverticulitis involving the distal descending colon, proximal sigmoid colon Patient returns for follow-up on 8/30/2022.  Several weeks ago she experienced another flareup of diverticulitis with left lower quadrant abdominal pain.  She underwent abdominal pelvic CT scan which was consistent with an acute diverticulitis involving the distal descending colon and proximal sigmoid colon.  She was given a 10-day course of Cipro and Flagyl, and her symptoms have completely resolved.  She is having no further abdominal pain, and her bowel movements are now normal.  She denies any overt bleeding, has had no unexplained weight loss.  She believes she was mildly constipated prior to this episode, and I again emphasized that she would benefit by beginning MiraLAX when she notices the onset of even a mild constipation.  She does drink plenty of fluids.  She has had mild gastroesophageal reflux, and was begun on famotidine 40 mg p.o. nightly by Dr. Fox.  Her symptoms have improved, and she has no swallowing issues. Patient returns for follow-up on 11/10/2022.  She states that she has had another recent flareup of diverticulitis with left lower quadrant pain and loose stools.  She was placed on Cipro and Flagyl by her primary care physician, and states that her bowel movements are more normal, but her left lower quadrant pain has persisted.  In addition she has had a flareup of her reflux.  Pantoprazole 40 mg p.o. every morning was added to her regimen which included famotidine 40 mg at bedtime.  She has been elevating the head of her bed.  She states that her reflux has also persisted, she is having no swallowing issues.  She denies any overt bleeding, has had no unexplained weight loss. Patient underwent lab work on 12/12/2022 which included CBC with hematocrit 35.9 hemoglobin 11.1 WBC 8.4 and normal iron studies.  She underwent abdominal pelvic CT scan on 12/20/2022 which showed extensive diverticulosis, but resolution of the previously seen acute diverticulitis. Patient returns for follow-up on 1/12/2023.  Overall she is doing well from a GI standpoint, and having no significant abdominal pain at this time.  We reviewed the results of her recent abdominal pelvic CT scan, which showed extensive diverticulosis, but resolution of the previously seen acute diverticulitis.  Her bowel movements have been essentially normal.  Her reflux has been under good control on daily famotidine therapy, she is discontinued the pantoprazole.  She is taking Benefiber.  She is having no swallowing issues.  She did eat a bowl of blueberries which cause mild abdominal discomfort, but this has resolved.  We again discussed in detail our goal of medically treating her diverticulitis, as she would be a difficult surgical candidate with extensive diverticulosis, and significant adhesions due to previous multiple abdominal surgeries. Patient returns for follow-up on 10/5/2023.  Since her last visit she has had no episodes of diverticulitis, and will occasionally have mild abdominal discomfort overall her bowel movements have been normal she has noticed an increase in her reflux symptoms and that she has had some coughing after meals and occasionally dry food will stick in her throat she denies any actual ongoing dysphagia to solid foods, and she does experience allergies.  She has had no unusual weight loss, no overt GI bleeding and overall has been feeling well from a GI standpoint. In February 2024 patient experienced left lower quadrant abdominal pain she was given a course of antibiotics which did not seem to help, and was evaluated in the emergency room at 2/12/2024 she underwent abdominal pelvic CT scan which showed extensive fecal loading and extensive sigmoid diverticulosis, but no inflammation or evidence of a diverticulitis. Patient returns for follow-up on 4/18/2024.  Since her visit to the emergency room for left lower quadrant abdominal pain in February, she has been feeling well she is having no further abdominal pain, eating well with a good appetite, and her reflux has been under good control with daily pantoprazole therapy.  She is having no swallowing issues.  She states she has not been constipated, and having more normal bowel movements.  We discussed whether she needed a follow-up colonoscopy, as she had a colonoscopy in 2019 showing only diverticulosis with no history of polyps, we do not need to schedule follow-up colonoscopy at this time.

## 2024-08-08 ENCOUNTER — OFFICE VISIT (OUTPATIENT)
Dept: URBAN - METROPOLITAN AREA CLINIC 40 | Facility: CLINIC | Age: 69
End: 2024-08-08
Payer: MEDICARE

## 2024-08-08 ENCOUNTER — DASHBOARD ENCOUNTERS (OUTPATIENT)
Age: 69
End: 2024-08-08

## 2024-08-08 ENCOUNTER — LAB OUTSIDE AN ENCOUNTER (OUTPATIENT)
Dept: URBAN - METROPOLITAN AREA CLINIC 40 | Facility: CLINIC | Age: 69
End: 2024-08-08

## 2024-08-08 VITALS
BODY MASS INDEX: 35 KG/M2 | HEART RATE: 73 BPM | WEIGHT: 205 LBS | TEMPERATURE: 97.9 F | SYSTOLIC BLOOD PRESSURE: 132 MMHG | DIASTOLIC BLOOD PRESSURE: 70 MMHG | HEIGHT: 64 IN

## 2024-08-08 DIAGNOSIS — R10.32 LLQ ABDOMINAL PAIN: ICD-10-CM

## 2024-08-08 DIAGNOSIS — K57.30 DIVERTICULA OF COLON: ICD-10-CM

## 2024-08-08 DIAGNOSIS — K59.09 CHRONIC CONSTIPATION: ICD-10-CM

## 2024-08-08 DIAGNOSIS — Z87.19 HISTORY OF DIVERTICULITIS OF COLON: ICD-10-CM

## 2024-08-08 PROCEDURE — 99214 OFFICE O/P EST MOD 30 MIN: CPT | Performed by: PHYSICIAN ASSISTANT

## 2024-08-08 RX ORDER — PANTOPRAZOLE SODIUM 40 MG/1
1 TABLET TABLET, DELAYED RELEASE ORAL ONCE A DAY
Qty: 90 TABLET | Refills: 3 | Status: ON HOLD | COMMUNITY

## 2024-08-08 RX ORDER — IBUPROFEN 800 MG/1
1 TABLET WITH FOOD OR MILK AS NEEDED TABLET, FILM COATED ORAL
Status: ACTIVE | COMMUNITY

## 2024-08-08 RX ORDER — FAMOTIDINE 40 MG/1
TAKE 1 TABLET BY MOUTH EVERY DAY AT BEDTIME TABLET, FILM COATED ORAL
Qty: 90 TABLET | Refills: 3 | Status: ACTIVE | COMMUNITY

## 2024-08-08 RX ORDER — LOSARTAN POTASSIUM 100 MG/1
1 TABLET TABLET ORAL ONCE A DAY
Status: ACTIVE | COMMUNITY

## 2024-08-08 RX ORDER — DICYCLOMINE HYDROCHLORIDE 20 MG/1
1 TABLET TABLET ORAL THREE TIMES A DAY
Qty: 270 TABLET | Refills: 3 | Status: ON HOLD | COMMUNITY

## 2024-08-08 RX ORDER — CETIRIZINE HYDROCHLORIDE 10 MG/1
1 TABLET TABLET, FILM COATED ORAL ONCE A DAY
Status: ACTIVE | COMMUNITY

## 2024-08-08 RX ORDER — FLUTICASONE PROPIONATE 50 UG/1
SPRAY, METERED NASAL
Qty: 0 | Refills: 0 | Status: ACTIVE | COMMUNITY
Start: 1900-01-01

## 2024-08-08 RX ORDER — TRAZODONE HYDROCHLORIDE 100 MG/1
1 TABLET AT BEDTIME TABLET ORAL ONCE A DAY
Status: ACTIVE | COMMUNITY

## 2024-08-08 RX ORDER — CIPROFLOXACIN HYDROCHLORIDE 500 MG/1
TAKE 1 TABLET (500 MG) BY ORAL ROUTE EVERY 12 HOURS FOR 10 DAYS TABLET, FILM COATED ORAL 2
Qty: 20 | Refills: 1 | Status: ON HOLD | COMMUNITY
Start: 2020-04-30

## 2024-08-08 RX ORDER — METRONIDAZOLE 500 MG/1
TAKE 1 TABLET (500 MG) BY ORAL ROUTE 3 TIMES PER DAY FOR 10 DAYS TABLET ORAL
Qty: 30 | Refills: 1 | Status: ON HOLD | COMMUNITY
Start: 2020-04-30

## 2024-08-08 RX ORDER — PROMETHAZINE HYDROCHLORIDE 25 MG/ML
1 ML AS NEEDED INJECTION INTRAMUSCULAR; INTRAVENOUS
Status: ON HOLD | COMMUNITY

## 2024-08-08 RX ORDER — HYDROCHLOROTHIAZIDE 25 MG/1
1 TABLET IN THE MORNING TABLET ORAL ONCE A DAY
Status: ACTIVE | COMMUNITY

## 2024-08-08 RX ORDER — ESZOPICLONE 2 MG/1
1 TABLET IMMEDIATELY BEFORE BEDTIME TABLET, FILM COATED ORAL ONCE A DAY
Status: ACTIVE | COMMUNITY

## 2024-08-08 NOTE — HPI-TODAY'S VISIT:
Ms. Rand is a 69 year old Black female who returns to office for follow up. She has a history of recurrent diverticulitis.  She has had a history of multiple abdominal surgeries, with lysis of adhesions.  She underwent previous colonoscopy on 1/14/2019 which showed multiple sigmoid diverticuli, and was otherwise normal. Multiple rounds of Cipro and Flagyl in the past. She has asked to avoid surgery if at all possible. Patient has had a recent flareup of diverticulitis, and underwent abdominal pelvic CT scan on 8/12/2022 findings are consistent with an acute diverticulitis involving the distal descending colon, proximal sigmoid colon. She also has gastroesophageal reflux, and was begun on famotidine 40 mg p.o. nightly by Dr. Fox.  Her symptoms have improved, and she has no swallowing issues. Pantoprazole 40 mg p.o. every morning was added to her regimen which included famotidine 40 mg at bedtime. She underwent abdominal pelvic CT scan on 12/20/2022 which showed extensive diverticulosis, but resolution of the previously seen acute diverticulitis. Patient was evaluated in the emergency room at 2/12/2024 she underwent abdominal pelvic CT scan which showed extensive fecal loading and extensive sigmoid diverticulosis, but no inflammation or evidence of a diverticulitis. Patient admits to recent diarrhea of uncertain etiology which did resolve.  This was followed by constipation.  With this, her left lower quadrant pain has returned.  No nausea, vomiting, fever or chills.  No rectal bleeding.  Denies changes in her medications.  No recent imaging.  She is feeling a little better, trying to eat a low residue diet but admits she ate a salad today which does cause some of GI upset.  No current use of pain medications.  No regular NSAID use.  Ibuprofen only as needed.  She states that she was recently able to come off of pantoprazole cording to her PCP as her reflux has improved.  However, she continues to take famotidine.  Stable today during visit.  Good appetite weight stable overall.  She plans to go on vacation with her  to late in the near.  She is also planning cataract surgery sometime in September.

## 2024-09-12 ENCOUNTER — OFFICE VISIT (OUTPATIENT)
Dept: URBAN - METROPOLITAN AREA CLINIC 40 | Facility: CLINIC | Age: 69
End: 2024-09-12
Payer: MEDICARE

## 2024-09-12 VITALS
HEIGHT: 64 IN | DIASTOLIC BLOOD PRESSURE: 90 MMHG | HEART RATE: 64 BPM | TEMPERATURE: 98.1 F | SYSTOLIC BLOOD PRESSURE: 148 MMHG | WEIGHT: 205.4 LBS | BODY MASS INDEX: 35.07 KG/M2

## 2024-09-12 DIAGNOSIS — K59.01 SLOW TRANSIT CONSTIPATION: ICD-10-CM

## 2024-09-12 DIAGNOSIS — R10.32 LLQ ABDOMINAL PAIN: ICD-10-CM

## 2024-09-12 DIAGNOSIS — K21.9 GASTROESOPHAGEAL REFLUX DISEASE, UNSPECIFIED WHETHER ESOPHAGITIS PRESENT: ICD-10-CM

## 2024-09-12 DIAGNOSIS — K59.09 CHRONIC CONSTIPATION: ICD-10-CM

## 2024-09-12 DIAGNOSIS — Z87.19 HISTORY OF DIVERTICULITIS OF COLON: ICD-10-CM

## 2024-09-12 PROCEDURE — 99214 OFFICE O/P EST MOD 30 MIN: CPT | Performed by: INTERNAL MEDICINE

## 2024-09-12 RX ORDER — FAMOTIDINE 40 MG/1
TAKE 1 TABLET BY MOUTH EVERY DAY AT BEDTIME TABLET, FILM COATED ORAL
Qty: 90 TABLET | Refills: 3 | Status: ACTIVE | COMMUNITY

## 2024-09-12 RX ORDER — PANTOPRAZOLE SODIUM 40 MG/1
1 TABLET TABLET, DELAYED RELEASE ORAL ONCE A DAY
Qty: 90 TABLET | Refills: 3 | Status: ON HOLD | COMMUNITY

## 2024-09-12 RX ORDER — DICYCLOMINE HYDROCHLORIDE 20 MG/1
1 TABLET TABLET ORAL THREE TIMES A DAY
Qty: 270 TABLET | Refills: 3 | OUTPATIENT

## 2024-09-12 RX ORDER — PANTOPRAZOLE SODIUM 40 MG/1
1 TABLET TABLET, DELAYED RELEASE ORAL ONCE A DAY
Qty: 90 TABLET | Refills: 3 | OUTPATIENT

## 2024-09-12 RX ORDER — ESZOPICLONE 2 MG/1
1 TABLET IMMEDIATELY BEFORE BEDTIME TABLET, FILM COATED ORAL ONCE A DAY
Status: ACTIVE | COMMUNITY

## 2024-09-12 RX ORDER — LOSARTAN POTASSIUM 100 MG/1
1 TABLET TABLET ORAL ONCE A DAY
Status: ACTIVE | COMMUNITY

## 2024-09-12 RX ORDER — CETIRIZINE HYDROCHLORIDE 10 MG/1
1 TABLET TABLET, FILM COATED ORAL ONCE A DAY
Status: ACTIVE | COMMUNITY

## 2024-09-12 RX ORDER — METRONIDAZOLE 500 MG/1
TAKE 1 TABLET (500 MG) BY ORAL ROUTE 3 TIMES PER DAY FOR 10 DAYS TABLET ORAL
Qty: 30 | Refills: 1 | Status: ON HOLD | COMMUNITY
Start: 2020-04-30

## 2024-09-12 RX ORDER — DICYCLOMINE HYDROCHLORIDE 20 MG/1
1 TABLET TABLET ORAL THREE TIMES A DAY
Qty: 270 TABLET | Refills: 3 | Status: ON HOLD | COMMUNITY

## 2024-09-12 RX ORDER — TRAZODONE HYDROCHLORIDE 100 MG/1
1 TABLET AT BEDTIME TABLET ORAL ONCE A DAY
Status: ACTIVE | COMMUNITY

## 2024-09-12 RX ORDER — HYDROCHLOROTHIAZIDE 25 MG/1
1 TABLET IN THE MORNING TABLET ORAL ONCE A DAY
Status: ACTIVE | COMMUNITY

## 2024-09-12 RX ORDER — IBUPROFEN 800 MG/1
1 TABLET WITH FOOD OR MILK AS NEEDED TABLET, FILM COATED ORAL
Status: ACTIVE | COMMUNITY

## 2024-09-12 RX ORDER — CIPROFLOXACIN HYDROCHLORIDE 500 MG/1
TAKE 1 TABLET (500 MG) BY ORAL ROUTE EVERY 12 HOURS FOR 10 DAYS TABLET, FILM COATED ORAL 2
Qty: 20 | Refills: 1 | Status: ON HOLD | COMMUNITY
Start: 2020-04-30

## 2024-09-12 RX ORDER — PROMETHAZINE HYDROCHLORIDE 25 MG/ML
1 ML AS NEEDED INJECTION INTRAMUSCULAR; INTRAVENOUS
Status: ON HOLD | COMMUNITY

## 2024-09-12 RX ORDER — FLUTICASONE PROPIONATE 50 UG/1
SPRAY, METERED NASAL
Qty: 0 | Refills: 0 | Status: ACTIVE | COMMUNITY
Start: 1900-01-01

## 2024-09-12 NOTE — PHYSICAL EXAM CHEST:
Date of Service: 02/22/2017    SUBJECTIVE:   I had the pleasure of seeing your patient, George Sewell, in [facility]iology [facility] on 02/22/2017.  As you know, he is a very pleasant 89-year-old gentleman with a history of tachybrady syndrome, status post dual-chamber pacemaker placed in 2012, [employer] device.  He has been on Amiodarone for maintenance of sinus rhythm, anticoagulated with Warfarin, had been on Acebutolol due to an intolerance of other beta blockers in the past with worsen fatigued, but this was changed to Carvedilol, which he has been tolerating better.  Amiodarone was subsequently also discontinued because of side effects and potential side effects.  His ejection fraction has been mildly reduced to EF 40% on echocardiography.  Recently, he has been doing fairly well with no syncope, no increased palpitations.  No chest, arm or neck discomfort bothering him, has chronic dyspnea on exertion, which is unchanged.  No increased ankle swelling.  Last fasting lipid profile reviewed.    SOCIAL HISTORY:  He does not smoke tobacco.    MEDICATIONS:  Reviewed in Epic.  Please see Epic for list.  He is on Aspirin 81 mg p.o. every day along with Warfarin.  He has had no melena, hematochezia or significant epistaxis.  Continues on Carvedilol.    PHYSICAL EXAMINATION:  VITAL SIGNS:  Blood pressure 138/64, pulse 66 beats and irregular.  Oxygen saturation 97% on room air.  Respiratory rate is 16 breaths per minute and unlabored.  HEENT:  Sclerae anicteric.  Oral mucosa moist.  NECK:  Supple, carotid upstrokes full without bruit is normal.  CARDIAC:  S1, S2 with a 1/6 to 2/6 systolic murmur heard in the left upper sternal border.  No diastolic murmur.  No gallop or rub.  CHEST:  No rales, rhonchi or wheezes.  Respiratory rate is 16 breaths and unlabored.  Symmetrical chest expansion.  ABDOMEN:  Normoactive bowel sounds.  No bruits.  EXTREMITIES:  Tibialis posterior pulses 2+.  No significant peripheral edema.    IN  chest wall non-tender, breathing is unlabored without accessory muscle use, normal breath sounds SUMMARY:  Pacer check today was performed which demonstrated good pacer function, 6.5 years left on battery estimated.  Heart rate well controlled on current dose of Carvedilol.  Discussed symptoms to be wary of and to report.  I made no medication changes today.  He seems to be doing fairly well.      Dictated By: Hiram Guerin MD  Signing Provider: Hiram Guerin MD    [id number] ([id number])  DD: 02/27/2017 20:53:31 TD: 02/27/2017 21:06:27    Copy Sent To:     cc: Cong Lilly DO

## 2024-09-12 NOTE — HPI-TODAY'S VISIT:
Ms. Rand is a 69 year old Black female who returns to office for follow up. She has a history of recurrent diverticulitis.  She has had a history of multiple abdominal surgeries, with lysis of adhesions.  She underwent previous colonoscopy on 1/14/2019 which showed multiple sigmoid diverticuli, and was otherwise normal. Multiple rounds of Cipro and Flagyl in the past. She has asked to avoid surgery if at all possible. Patient has had a recent flareup of diverticulitis, and underwent abdominal pelvic CT scan on 8/12/2022 findings are consistent with an acute diverticulitis involving the distal descending colon, proximal sigmoid colon. She also has gastroesophageal reflux, and was begun on famotidine 40 mg p.o. nightly by Dr. Fox.  Her symptoms have improved, and she has no swallowing issues. Pantoprazole 40 mg p.o. every morning was added to her regimen which included famotidine 40 mg at bedtime. She underwent abdominal pelvic CT scan on 12/20/2022 which showed extensive diverticulosis, but resolution of the previously seen acute diverticulitis. Patient was evaluated in the emergency room at 2/12/2024 she underwent abdominal pelvic CT scan which showed extensive fecal loading and extensive sigmoid diverticulosis, but no inflammation or evidence of a diverticulitis. Patient admits to recent diarrhea of uncertain etiology which did resolve.  This was followed by constipation.  With this, her left lower quadrant pain has returned.  No nausea, vomiting, fever or chills.  No rectal bleeding.  Denies changes in her medications.  No recent imaging.  She is feeling a little better, trying to eat a low residue diet but admits she ate a salad today which does cause some of GI upset.  No current use of pain medications.  No regular NSAID use.  Ibuprofen only as needed.  She states that she was recently able to come off of pantoprazole cording to her PCP as her reflux has improved.  However, she continues to take famotidine.  Stable today during visit.  Good appetite weight stable overall.  She plans to go on vacation with her  to late in the near.  She is also planning cataract surgery sometime in September. Patient was last seen in our office on 4/30/2020.  She has had a history of recurrent diverticulitis.  She has had a history of multiple abdominal surgeries, with lysis of adhesions.  When last evaluated she was experiencing sudden onset of left lower quadrant abdominal pain.  She was evaluated in the emergency room, underwent abdominal pelvic CT scan showing an umbilical hernia, fatty liver diverticulosis without obvious inflammation.  Due to the persistence of the pain she was treated with a course of Cipro and Flagyl.  She underwent previous colonoscopy on 1/14/2019 which showed multiple sigmoid diverticuli, and was otherwise normal. Patient presented to her primary care physician on 11/1/2021 with left lower quadrant pain consistent with a flareup of her diverticulitis.  She was given a course of Cipro and Flagyl, and referred back to our practice for follow-up. Patient returns for follow-up on 11/9/2021.  She experienced a recent flareup of her diverticulitis, and was given a 10-day course of Cipro and Flagyl by her primary care physician.  She started feeling better after 3 days.  She is now feeling well with essentially no abdominal pain.  She does have mild constipation, and we advised her to use MiraLAX as needed.  She does drink plenty of fluids.  She wishes to avoid surgery if at all possible.  We discussed that since this was the first real flareup in over a year, avoiding surgery seems reasonable, especially as she does have significant adhesions due to previous multiple abdominal surgeries.  I advised her to avoid constipation, and to call us with the early onset of the symptoms, so antibiotic therapy can be started in a timely fashion.  As she had her last colonoscopy on 1/4/2019, showing multiple sigmoid diverticuli, we do not need to repeat colonoscopy at this time. Patient has had a recent flareup of diverticulitis, and underwent abdominal pelvic CT scan on 8/12/2022 findings are consistent with an acute diverticulitis involving the distal descending colon, proximal sigmoid colon Patient returns for follow-up on 8/30/2022.  Several weeks ago she experienced another flareup of diverticulitis with left lower quadrant abdominal pain.  She underwent abdominal pelvic CT scan which was consistent with an acute diverticulitis involving the distal descending colon and proximal sigmoid colon.  She was given a 10-day course of Cipro and Flagyl, and her symptoms have completely resolved.  She is having no further abdominal pain, and her bowel movements are now normal.  She denies any overt bleeding, has had no unexplained weight loss.  She believes she was mildly constipated prior to this episode, and I again emphasized that she would benefit by beginning MiraLAX when she notices the onset of even a mild constipation.  She does drink plenty of fluids.  She has had mild gastroesophageal reflux, and was begun on famotidine 40 mg p.o. nightly by Dr. Fox.  Her symptoms have improved, and she has no swallowing issues. Patient returns for follow-up on 11/10/2022.  She states that she has had another recent flareup of diverticulitis with left lower quadrant pain and loose stools.  She was placed on Cipro and Flagyl by her primary care physician, and states that her bowel movements are more normal, but her left lower quadrant pain has persisted.  In addition she has had a flareup of her reflux.  Pantoprazole 40 mg p.o. every morning was added to her regimen which included famotidine 40 mg at bedtime.  She has been elevating the head of her bed.  She states that her reflux has also persisted, she is having no swallowing issues.  She denies any overt bleeding, has had no unexplained weight loss. Patient underwent lab work on 12/12/2022 which included CBC with hematocrit 35.9 hemoglobin 11.1 WBC 8.4 and normal iron studies.  She underwent abdominal pelvic CT scan on 12/20/2022 which showed extensive diverticulosis, but resolution of the previously seen acute diverticulitis. Patient returns for follow-up on 1/12/2023.  Overall she is doing well from a GI standpoint, and having no significant abdominal pain at this time.  We reviewed the results of her recent abdominal pelvic CT scan, which showed extensive diverticulosis, but resolution of the previously seen acute diverticulitis.  Her bowel movements have been essentially normal.  Her reflux has been under good control on daily famotidine therapy, she is discontinued the pantoprazole.  She is taking Benefiber.  She is having no swallowing issues.  She did eat a bowl of blueberries which cause mild abdominal discomfort, but this has resolved.  We again discussed in detail our goal of medically treating her diverticulitis, as she would be a difficult surgical candidate with extensive diverticulosis, and significant adhesions due to previous multiple abdominal surgeries. Patient returns for follow-up on 10/5/2023.  Since her last visit she has had no episodes of diverticulitis, and will occasionally have mild abdominal discomfort overall her bowel movements have been normal she has noticed an increase in her reflux symptoms and that she has had some coughing after meals and occasionally dry food will stick in her throat she denies any actual ongoing dysphagia to solid foods, and she does experience allergies.  She has had no unusual weight loss, no overt GI bleeding and overall has been feeling well from a GI standpoint. In February 2024 patient experienced left lower quadrant abdominal pain she was given a course of antibiotics which did not seem to help, and was evaluated in the emergency room at 2/12/2024 she underwent abdominal pelvic CT scan which showed extensive fecal loading and extensive sigmoid diverticulosis, but no inflammation or evidence of a diverticulitis. Patient returns for follow-up on 4/18/2024.  Since her visit to the emergency room for left lower quadrant abdominal pain in February, she has been feeling well she is having no further abdominal pain, eating well with a good appetite, and her reflux has been under good control with daily pantoprazole therapy.  She is having no swallowing issues.  She states she has not been constipated, and having more normal bowel movements.  We discussed whether she needed a follow-up colonoscopy, as she had a colonoscopy in 2019 showing only diverticulosis with no history of polyps, we do not need to schedule follow-up colonoscopy at this time. Patient is undergone abdominal pelvic CT scan on 8/30/2024. Patient returns for follow-up on 9/12/2024. Overall she has been feeling well from a GI standpoint. She has been able to discontinue the pantoprazole as recommended by her primary care physician about 2 months ago, and is having no significant heartburn or reflux.  She continues to take famotidine 40 mg at bedtime.  She is having no swallowing issues.  She states that when she lies on her left side to sleep, it will wake her up and she has to lie on her right side.  This pain is actually more located at the region of her left hip.  Her bowel movements have been essentially normal, and she has had no recent episodes of diverticulitis.  She did undergo an abdominal pelvic CT scan on 8/30/2024 which showed a normal-appearing liver a small periumbilical hernia scattered diverticulosis with no acute abnormalities.

## 2024-09-12 NOTE — PHYSICAL EXAM GASTROINTESTINAL
Abdomen Obese, soft, nontender, nondistended , no guarding or rigidity , no masses palpable , normal bowel sounds , Liver and Spleen,  no hepatosplenomegaly , liver nontender, small umbilical hernia, reducible and non-tender

## 2024-09-18 ENCOUNTER — TELEPHONE ENCOUNTER (OUTPATIENT)
Dept: URBAN - METROPOLITAN AREA CLINIC 40 | Facility: CLINIC | Age: 69
End: 2024-09-18

## 2024-09-23 ENCOUNTER — P2P PATIENT RECORD (OUTPATIENT)
Age: 69
End: 2024-09-23

## 2024-10-08 ENCOUNTER — OFFICE VISIT (OUTPATIENT)
Dept: URBAN - METROPOLITAN AREA CLINIC 40 | Facility: CLINIC | Age: 69
End: 2024-10-08
Payer: MEDICARE

## 2024-10-08 VITALS
SYSTOLIC BLOOD PRESSURE: 138 MMHG | TEMPERATURE: 98.2 F | WEIGHT: 204.2 LBS | HEART RATE: 82 BPM | HEIGHT: 64 IN | BODY MASS INDEX: 34.86 KG/M2 | DIASTOLIC BLOOD PRESSURE: 86 MMHG

## 2024-10-08 DIAGNOSIS — K59.09 CHRONIC CONSTIPATION: ICD-10-CM

## 2024-10-08 DIAGNOSIS — K21.9 GASTROESOPHAGEAL REFLUX DISEASE, UNSPECIFIED WHETHER ESOPHAGITIS PRESENT: ICD-10-CM

## 2024-10-08 DIAGNOSIS — Z87.19 HISTORY OF DIVERTICULITIS OF COLON: ICD-10-CM

## 2024-10-08 PROCEDURE — 99214 OFFICE O/P EST MOD 30 MIN: CPT | Performed by: INTERNAL MEDICINE

## 2024-10-08 RX ORDER — PANTOPRAZOLE SODIUM 40 MG/1
1 TABLET TABLET, DELAYED RELEASE ORAL ONCE A DAY
Qty: 90 TABLET | Refills: 3 | OUTPATIENT

## 2024-10-08 RX ORDER — IBUPROFEN 800 MG/1
1 TABLET WITH FOOD OR MILK AS NEEDED TABLET, FILM COATED ORAL
Status: ACTIVE | COMMUNITY

## 2024-10-08 RX ORDER — PROMETHAZINE HYDROCHLORIDE 25 MG/ML
1 ML AS NEEDED INJECTION INTRAMUSCULAR; INTRAVENOUS
Status: ON HOLD | COMMUNITY

## 2024-10-08 RX ORDER — ESZOPICLONE 2 MG/1
1 TABLET IMMEDIATELY BEFORE BEDTIME TABLET, FILM COATED ORAL ONCE A DAY
Status: ACTIVE | COMMUNITY

## 2024-10-08 RX ORDER — FLUTICASONE PROPIONATE 50 UG/1
SPRAY, METERED NASAL
Qty: 0 | Refills: 0 | Status: ACTIVE | COMMUNITY
Start: 1900-01-01

## 2024-10-08 RX ORDER — DICYCLOMINE HYDROCHLORIDE 20 MG/1
1 TABLET TABLET ORAL THREE TIMES A DAY
Qty: 270 TABLET | Refills: 3 | Status: ACTIVE | COMMUNITY

## 2024-10-08 RX ORDER — FAMOTIDINE 40 MG/1
TAKE 1 TABLET BY MOUTH EVERY DAY AT BEDTIME TABLET, FILM COATED ORAL
Qty: 90 TABLET | Refills: 3 | Status: ACTIVE | COMMUNITY

## 2024-10-08 RX ORDER — PANTOPRAZOLE SODIUM 40 MG/1
1 TABLET TABLET, DELAYED RELEASE ORAL ONCE A DAY
Qty: 90 TABLET | Refills: 3 | Status: ACTIVE | COMMUNITY

## 2024-10-08 RX ORDER — HYDROCHLOROTHIAZIDE 25 MG/1
1 TABLET IN THE MORNING TABLET ORAL ONCE A DAY
Status: ACTIVE | COMMUNITY

## 2024-10-08 RX ORDER — METRONIDAZOLE 500 MG/1
TAKE 1 TABLET (500 MG) BY ORAL ROUTE 3 TIMES PER DAY FOR 10 DAYS TABLET ORAL
Qty: 30 | Refills: 1 | Status: ON HOLD | COMMUNITY
Start: 2020-04-30

## 2024-10-08 RX ORDER — CETIRIZINE HYDROCHLORIDE 10 MG/1
1 TABLET TABLET, FILM COATED ORAL ONCE A DAY
Status: ACTIVE | COMMUNITY

## 2024-10-08 RX ORDER — PANTOPRAZOLE SODIUM 40 MG/1
1 TABLET TABLET, DELAYED RELEASE ORAL ONCE A DAY
Qty: 90 TABLET | Refills: 3 | OUTPATIENT
Start: 2024-10-08

## 2024-10-08 RX ORDER — DICYCLOMINE HYDROCHLORIDE 20 MG/1
1 TABLET TABLET ORAL THREE TIMES A DAY
Qty: 270 TABLET | Refills: 3 | OUTPATIENT
Start: 2024-10-08 | End: 2025-10-03

## 2024-10-08 RX ORDER — LINACLOTIDE 145 UG/1
1 CAPSULE AT LEAST 30 MINUTES BEFORE THE FIRST MEAL OF THE DAY ON AN EMPTY STOMACH CAPSULE, GELATIN COATED ORAL ONCE A DAY
Qty: 90 | Refills: 3 | OUTPATIENT
Start: 2024-10-08 | End: 2025-10-03

## 2024-10-08 RX ORDER — CIPROFLOXACIN HYDROCHLORIDE 500 MG/1
TAKE 1 TABLET (500 MG) BY ORAL ROUTE EVERY 12 HOURS FOR 10 DAYS TABLET, FILM COATED ORAL 2
Qty: 20 | Refills: 1 | Status: ON HOLD | COMMUNITY
Start: 2020-04-30

## 2024-10-08 RX ORDER — TRAZODONE HYDROCHLORIDE 100 MG/1
1 TABLET AT BEDTIME TABLET ORAL ONCE A DAY
Status: ACTIVE | COMMUNITY

## 2024-10-08 RX ORDER — LOSARTAN POTASSIUM 100 MG/1
1 TABLET TABLET ORAL ONCE A DAY
Status: ACTIVE | COMMUNITY

## 2024-10-08 NOTE — HPI-TODAY'S VISIT:
Ms. Rand is a 69 year old Black female who returns to office for follow up. She has a history of recurrent diverticulitis.  She has had a history of multiple abdominal surgeries, with lysis of adhesions.  She underwent previous colonoscopy on 1/14/2019 which showed multiple sigmoid diverticuli, and was otherwise normal. Multiple rounds of Cipro and Flagyl in the past. She has asked to avoid surgery if at all possible. Patient has had a recent flareup of diverticulitis, and underwent abdominal pelvic CT scan on 8/12/2022 findings are consistent with an acute diverticulitis involving the distal descending colon, proximal sigmoid colon. She also has gastroesophageal reflux, and was begun on famotidine 40 mg p.o. nightly by Dr. Fox.  Her symptoms have improved, and she has no swallowing issues. Pantoprazole 40 mg p.o. every morning was added to her regimen which included famotidine 40 mg at bedtime. She underwent abdominal pelvic CT scan on 12/20/2022 which showed extensive diverticulosis, but resolution of the previously seen acute diverticulitis. Patient was evaluated in the emergency room at 2/12/2024 she underwent abdominal pelvic CT scan which showed extensive fecal loading and extensive sigmoid diverticulosis, but no inflammation or evidence of a diverticulitis. Patient admits to recent diarrhea of uncertain etiology which did resolve.  This was followed by constipation.  With this, her left lower quadrant pain has returned.  No nausea, vomiting, fever or chills.  No rectal bleeding.  Denies changes in her medications.  No recent imaging.  She is feeling a little better, trying to eat a low residue diet but admits she ate a salad today which does cause some of GI upset.  No current use of pain medications.  No regular NSAID use.  Ibuprofen only as needed.  She states that she was recently able to come off of pantoprazole cording to her PCP as her reflux has improved.  However, she continues to take famotidine.  Stable today during visit.  Good appetite weight stable overall.  She plans to go on vacation with her  to late in the near.  She is also planning cataract surgery sometime in September. Patient was last seen in our office on 4/30/2020.  She has had a history of recurrent diverticulitis.  She has had a history of multiple abdominal surgeries, with lysis of adhesions.  When last evaluated she was experiencing sudden onset of left lower quadrant abdominal pain.  She was evaluated in the emergency room, underwent abdominal pelvic CT scan showing an umbilical hernia, fatty liver diverticulosis without obvious inflammation.  Due to the persistence of the pain she was treated with a course of Cipro and Flagyl.  She underwent previous colonoscopy on 1/14/2019 which showed multiple sigmoid diverticuli, and was otherwise normal. Patient presented to her primary care physician on 11/1/2021 with left lower quadrant pain consistent with a flareup of her diverticulitis.  She was given a course of Cipro and Flagyl, and referred back to our practice for follow-up. Patient returns for follow-up on 11/9/2021.  She experienced a recent flareup of her diverticulitis, and was given a 10-day course of Cipro and Flagyl by her primary care physician.  She started feeling better after 3 days.  She is now feeling well with essentially no abdominal pain.  She does have mild constipation, and we advised her to use MiraLAX as needed.  She does drink plenty of fluids.  She wishes to avoid surgery if at all possible.  We discussed that since this was the first real flareup in over a year, avoiding surgery seems reasonable, especially as she does have significant adhesions due to previous multiple abdominal surgeries.  I advised her to avoid constipation, and to call us with the early onset of the symptoms, so antibiotic therapy can be started in a timely fashion.  As she had her last colonoscopy on 1/4/2019, showing multiple sigmoid diverticuli, we do not need to repeat colonoscopy at this time. Patient has had a recent flareup of diverticulitis, and underwent abdominal pelvic CT scan on 8/12/2022 findings are consistent with an acute diverticulitis involving the distal descending colon, proximal sigmoid colon Patient returns for follow-up on 8/30/2022.  Several weeks ago she experienced another flareup of diverticulitis with left lower quadrant abdominal pain.  She underwent abdominal pelvic CT scan which was consistent with an acute diverticulitis involving the distal descending colon and proximal sigmoid colon.  She was given a 10-day course of Cipro and Flagyl, and her symptoms have completely resolved.  She is having no further abdominal pain, and her bowel movements are now normal.  She denies any overt bleeding, has had no unexplained weight loss.  She believes she was mildly constipated prior to this episode, and I again emphasized that she would benefit by beginning MiraLAX when she notices the onset of even a mild constipation.  She does drink plenty of fluids.  She has had mild gastroesophageal reflux, and was begun on famotidine 40 mg p.o. nightly by Dr. Fox.  Her symptoms have improved, and she has no swallowing issues. Patient returns for follow-up on 11/10/2022.  She states that she has had another recent flareup of diverticulitis with left lower quadrant pain and loose stools.  She was placed on Cipro and Flagyl by her primary care physician, and states that her bowel movements are more normal, but her left lower quadrant pain has persisted.  In addition she has had a flareup of her reflux.  Pantoprazole 40 mg p.o. every morning was added to her regimen which included famotidine 40 mg at bedtime.  She has been elevating the head of her bed.  She states that her reflux has also persisted, she is having no swallowing issues.  She denies any overt bleeding, has had no unexplained weight loss. Patient underwent lab work on 12/12/2022 which included CBC with hematocrit 35.9 hemoglobin 11.1 WBC 8.4 and normal iron studies.  She underwent abdominal pelvic CT scan on 12/20/2022 which showed extensive diverticulosis, but resolution of the previously seen acute diverticulitis. Patient returns for follow-up on 1/12/2023.  Overall she is doing well from a GI standpoint, and having no significant abdominal pain at this time.  We reviewed the results of her recent abdominal pelvic CT scan, which showed extensive diverticulosis, but resolution of the previously seen acute diverticulitis.  Her bowel movements have been essentially normal.  Her reflux has been under good control on daily famotidine therapy, she is discontinued the pantoprazole.  She is taking Benefiber.  She is having no swallowing issues.  She did eat a bowl of blueberries which cause mild abdominal discomfort, but this has resolved.  We again discussed in detail our goal of medically treating her diverticulitis, as she would be a difficult surgical candidate with extensive diverticulosis, and significant adhesions due to previous multiple abdominal surgeries. Patient returns for follow-up on 10/5/2023.  Since her last visit she has had no episodes of diverticulitis, and will occasionally have mild abdominal discomfort overall her bowel movements have been normal she has noticed an increase in her reflux symptoms and that she has had some coughing after meals and occasionally dry food will stick in her throat she denies any actual ongoing dysphagia to solid foods, and she does experience allergies.  She has had no unusual weight loss, no overt GI bleeding and overall has been feeling well from a GI standpoint. In February 2024 patient experienced left lower quadrant abdominal pain she was given a course of antibiotics which did not seem to help, and was evaluated in the emergency room at 2/12/2024 she underwent abdominal pelvic CT scan which showed extensive fecal loading and extensive sigmoid diverticulosis, but no inflammation or evidence of a diverticulitis. Patient returns for follow-up on 4/18/2024.  Since her visit to the emergency room for left lower quadrant abdominal pain in February, she has been feeling well she is having no further abdominal pain, eating well with a good appetite, and her reflux has been under good control with daily pantoprazole therapy.  She is having no swallowing issues.  She states she has not been constipated, and having more normal bowel movements.  We discussed whether she needed a follow-up colonoscopy, as she had a colonoscopy in 2019 showing only diverticulosis with no history of polyps, we do not need to schedule follow-up colonoscopy at this time. Patient is undergone abdominal pelvic CT scan on 8/30/2024. Patient returns for follow-up on 9/12/2024. Overall she has been feeling well from a GI standpoint. She has been able to discontinue the pantoprazole as recommended by her primary care physician about 2 months ago, and is having no significant heartburn or reflux.  She continues to take famotidine 40 mg at bedtime.  She is having no swallowing issues.  She states that when she lies on her left side to sleep, it will wake her up and she has to lie on her right side.  This pain is actually more located at the region of her left hip.  Her bowel movements have been essentially normal, and she has had no recent episodes of diverticulitis.  She did undergo an abdominal pelvic CT scan on 8/30/2024 which showed a normal-appearing liver a small periumbilical hernia scattered diverticulosis with no acute abnormalities. Patient returns for follow-up on 10/8/2024 .  She states that about 3 weeks ago she experienced significant rectal fullness and discomfort, accompanied by constipation.  She described the pain as if someone got a hold of her guts and were twisting.  She gave herself a fleets enema, bowel movement resulted and she felt better.  Overall she has been experiencing chronic constipation when taking Linzess she would have more normal bowel movements, but was only using Linzess every few days.  She has had no recent episodes of diverticulitis.  She does drink fluids, but needs to increase her intake.  She denies any overt bleeding, and at present denies abdominal pain.  Her heartburn and reflux have been under good control on daily pantoprazole therapy and also famotidine 40 mg at bedtime she has been taking dicyclomine 20 mg 3 times daily which has helped abdominal discomfort.  She is having no swallowing issues at present, and denies any overt GI bleeding.  Her most recent abdominal pelvic CT scan done 8/30/2024 showed a normal-appearing liver a small umbilical hernia with scattered diverticulosis and no acute abnormalities.

## 2024-10-16 ENCOUNTER — TELEPHONE ENCOUNTER (OUTPATIENT)
Dept: URBAN - METROPOLITAN AREA CLINIC 40 | Facility: CLINIC | Age: 69
End: 2024-10-16

## 2024-10-17 ENCOUNTER — OFFICE VISIT (OUTPATIENT)
Dept: URBAN - METROPOLITAN AREA CLINIC 40 | Facility: CLINIC | Age: 69
End: 2024-10-17

## 2024-10-23 ENCOUNTER — TELEPHONE ENCOUNTER (OUTPATIENT)
Dept: URBAN - METROPOLITAN AREA CLINIC 40 | Facility: CLINIC | Age: 69
End: 2024-10-23

## 2025-01-09 ENCOUNTER — OFFICE VISIT (OUTPATIENT)
Dept: URBAN - METROPOLITAN AREA CLINIC 40 | Facility: CLINIC | Age: 70
End: 2025-01-09

## 2025-01-16 ENCOUNTER — OFFICE VISIT (OUTPATIENT)
Dept: URBAN - METROPOLITAN AREA CLINIC 40 | Facility: CLINIC | Age: 70
End: 2025-01-16
Payer: MEDICARE

## 2025-01-16 VITALS
DIASTOLIC BLOOD PRESSURE: 70 MMHG | BODY MASS INDEX: 35.51 KG/M2 | WEIGHT: 208 LBS | TEMPERATURE: 98.1 F | SYSTOLIC BLOOD PRESSURE: 128 MMHG | HEART RATE: 84 BPM | HEIGHT: 64 IN

## 2025-01-16 DIAGNOSIS — K59.09 CHRONIC CONSTIPATION: ICD-10-CM

## 2025-01-16 DIAGNOSIS — Z87.19 HISTORY OF DIVERTICULITIS OF COLON: ICD-10-CM

## 2025-01-16 DIAGNOSIS — K21.9 GASTROESOPHAGEAL REFLUX DISEASE, UNSPECIFIED WHETHER ESOPHAGITIS PRESENT: ICD-10-CM

## 2025-01-16 PROCEDURE — 99214 OFFICE O/P EST MOD 30 MIN: CPT | Performed by: INTERNAL MEDICINE

## 2025-01-16 RX ORDER — HYDROCHLOROTHIAZIDE 25 MG/1
1 TABLET IN THE MORNING TABLET ORAL ONCE A DAY
Status: ACTIVE | COMMUNITY

## 2025-01-16 RX ORDER — PANTOPRAZOLE SODIUM 40 MG/1
1 TABLET TABLET, DELAYED RELEASE ORAL ONCE A DAY
Qty: 90 TABLET | Refills: 3 | Status: ON HOLD | COMMUNITY

## 2025-01-16 RX ORDER — DICYCLOMINE HYDROCHLORIDE 20 MG/1
1 TABLET TABLET ORAL THREE TIMES A DAY
Qty: 270 TABLET | Refills: 3 | OUTPATIENT

## 2025-01-16 RX ORDER — METRONIDAZOLE 500 MG/1
TAKE 1 TABLET (500 MG) BY ORAL ROUTE 3 TIMES PER DAY FOR 10 DAYS TABLET ORAL
Qty: 30 | Refills: 1 | Status: ON HOLD | COMMUNITY
Start: 2020-04-30

## 2025-01-16 RX ORDER — PROMETHAZINE HYDROCHLORIDE 25 MG/ML
1 ML AS NEEDED INJECTION INTRAMUSCULAR; INTRAVENOUS
Status: ON HOLD | COMMUNITY

## 2025-01-16 RX ORDER — FLUTICASONE PROPIONATE 50 UG/1
SPRAY, METERED NASAL
Qty: 0 | Refills: 0 | Status: ACTIVE | COMMUNITY
Start: 1900-01-01

## 2025-01-16 RX ORDER — ESZOPICLONE 2 MG/1
1 TABLET IMMEDIATELY BEFORE BEDTIME TABLET, FILM COATED ORAL ONCE A DAY
Status: ACTIVE | COMMUNITY

## 2025-01-16 RX ORDER — PANTOPRAZOLE SODIUM 40 MG/1
1 TABLET TABLET, DELAYED RELEASE ORAL ONCE A DAY
Qty: 90 TABLET | Refills: 3 | Status: ACTIVE | COMMUNITY
Start: 2024-10-08

## 2025-01-16 RX ORDER — DICYCLOMINE HYDROCHLORIDE 20 MG/1
1 TABLET TABLET ORAL THREE TIMES A DAY
Qty: 270 TABLET | Refills: 3 | Status: ACTIVE | COMMUNITY
Start: 2024-10-08 | End: 2025-10-03

## 2025-01-16 RX ORDER — PANTOPRAZOLE SODIUM 40 MG/1
1 TABLET TABLET, DELAYED RELEASE ORAL ONCE A DAY
Qty: 90 TABLET | Refills: 3 | OUTPATIENT

## 2025-01-16 RX ORDER — LINACLOTIDE 145 UG/1
1 CAPSULE AT LEAST 30 MINUTES BEFORE THE FIRST MEAL OF THE DAY ON AN EMPTY STOMACH CAPSULE, GELATIN COATED ORAL ONCE A DAY
Qty: 90 | Refills: 3 | OUTPATIENT

## 2025-01-16 RX ORDER — TRAZODONE HYDROCHLORIDE 100 MG/1
1 TABLET AT BEDTIME TABLET ORAL ONCE A DAY
Status: ACTIVE | COMMUNITY

## 2025-01-16 RX ORDER — CIPROFLOXACIN HYDROCHLORIDE 500 MG/1
TAKE 1 TABLET (500 MG) BY ORAL ROUTE EVERY 12 HOURS FOR 10 DAYS TABLET, FILM COATED ORAL 2
Qty: 20 | Refills: 1 | Status: ON HOLD | COMMUNITY
Start: 2020-04-30

## 2025-01-16 RX ORDER — IBUPROFEN 800 MG/1
1 TABLET WITH FOOD OR MILK AS NEEDED TABLET, FILM COATED ORAL
Status: ACTIVE | COMMUNITY

## 2025-01-16 RX ORDER — LOSARTAN POTASSIUM 100 MG/1
1 TABLET TABLET ORAL ONCE A DAY
Status: ACTIVE | COMMUNITY

## 2025-01-16 RX ORDER — DICYCLOMINE HYDROCHLORIDE 20 MG/1
1 TABLET TABLET ORAL THREE TIMES A DAY
Qty: 270 TABLET | Refills: 3 | Status: ON HOLD | COMMUNITY

## 2025-01-16 RX ORDER — FAMOTIDINE 40 MG/1
TAKE 1 TABLET BY MOUTH EVERY DAY AT BEDTIME TABLET, FILM COATED ORAL
Qty: 90 TABLET | Refills: 3 | Status: ACTIVE | COMMUNITY

## 2025-01-16 RX ORDER — CETIRIZINE HYDROCHLORIDE 10 MG/1
1 TABLET TABLET, FILM COATED ORAL ONCE A DAY
Status: ACTIVE | COMMUNITY

## 2025-01-16 RX ORDER — LINACLOTIDE 145 UG/1
1 CAPSULE AT LEAST 30 MINUTES BEFORE THE FIRST MEAL OF THE DAY ON AN EMPTY STOMACH CAPSULE, GELATIN COATED ORAL ONCE A DAY
Qty: 90 | Refills: 3 | Status: ACTIVE | COMMUNITY
Start: 2024-10-08 | End: 2025-10-03

## 2025-01-16 NOTE — HPI-TODAY'S VISIT:
Ms. Rand is a 69 year old Black female who returns to office for follow up. She has a history of recurrent diverticulitis.  She has had a history of multiple abdominal surgeries, with lysis of adhesions.  She underwent previous colonoscopy on 1/14/2019 which showed multiple sigmoid diverticuli, and was otherwise normal. Multiple rounds of Cipro and Flagyl in the past. She has asked to avoid surgery if at all possible. Patient has had a recent flareup of diverticulitis, and underwent abdominal pelvic CT scan on 8/12/2022 findings are consistent with an acute diverticulitis involving the distal descending colon, proximal sigmoid colon. She also has gastroesophageal reflux, and was begun on famotidine 40 mg p.o. nightly by Dr. Fox.  Her symptoms have improved, and she has no swallowing issues. Pantoprazole 40 mg p.o. every morning was added to her regimen which included famotidine 40 mg at bedtime. She underwent abdominal pelvic CT scan on 12/20/2022 which showed extensive diverticulosis, but resolution of the previously seen acute diverticulitis. Patient was evaluated in the emergency room at 2/12/2024 she underwent abdominal pelvic CT scan which showed extensive fecal loading and extensive sigmoid diverticulosis, but no inflammation or evidence of a diverticulitis. Patient admits to recent diarrhea of uncertain etiology which did resolve.  This was followed by constipation.  With this, her left lower quadrant pain has returned.  No nausea, vomiting, fever or chills.  No rectal bleeding.  Denies changes in her medications.  No recent imaging.  She is feeling a little better, trying to eat a low residue diet but admits she ate a salad today which does cause some of GI upset.  No current use of pain medications.  No regular NSAID use.  Ibuprofen only as needed.  She states that she was recently able to come off of pantoprazole cording to her PCP as her reflux has improved.  However, she continues to take famotidine.  Stable today during visit.  Good appetite weight stable overall.  She plans to go on vacation with her  to late in the near.  She is also planning cataract surgery sometime in September. Patient was last seen in our office on 4/30/2020.  She has had a history of recurrent diverticulitis.  She has had a history of multiple abdominal surgeries, with lysis of adhesions.  When last evaluated she was experiencing sudden onset of left lower quadrant abdominal pain.  She was evaluated in the emergency room, underwent abdominal pelvic CT scan showing an umbilical hernia, fatty liver diverticulosis without obvious inflammation.  Due to the persistence of the pain she was treated with a course of Cipro and Flagyl.  She underwent previous colonoscopy on 1/14/2019 which showed multiple sigmoid diverticuli, and was otherwise normal. Patient presented to her primary care physician on 11/1/2021 with left lower quadrant pain consistent with a flareup of her diverticulitis.  She was given a course of Cipro and Flagyl, and referred back to our practice for follow-up. Patient returns for follow-up on 11/9/2021.  She experienced a recent flareup of her diverticulitis, and was given a 10-day course of Cipro and Flagyl by her primary care physician.  She started feeling better after 3 days.  She is now feeling well with essentially no abdominal pain.  She does have mild constipation, and we advised her to use MiraLAX as needed.  She does drink plenty of fluids.  She wishes to avoid surgery if at all possible.  We discussed that since this was the first real flareup in over a year, avoiding surgery seems reasonable, especially as she does have significant adhesions due to previous multiple abdominal surgeries.  I advised her to avoid constipation, and to call us with the early onset of the symptoms, so antibiotic therapy can be started in a timely fashion.  As she had her last colonoscopy on 1/4/2019, showing multiple sigmoid diverticuli, we do not need to repeat colonoscopy at this time. Patient has had a recent flareup of diverticulitis, and underwent abdominal pelvic CT scan on 8/12/2022 findings are consistent with an acute diverticulitis involving the distal descending colon, proximal sigmoid colon Patient returns for follow-up on 8/30/2022.  Several weeks ago she experienced another flareup of diverticulitis with left lower quadrant abdominal pain.  She underwent abdominal pelvic CT scan which was consistent with an acute diverticulitis involving the distal descending colon and proximal sigmoid colon.  She was given a 10-day course of Cipro and Flagyl, and her symptoms have completely resolved.  She is having no further abdominal pain, and her bowel movements are now normal.  She denies any overt bleeding, has had no unexplained weight loss.  She believes she was mildly constipated prior to this episode, and I again emphasized that she would benefit by beginning MiraLAX when she notices the onset of even a mild constipation.  She does drink plenty of fluids.  She has had mild gastroesophageal reflux, and was begun on famotidine 40 mg p.o. nightly by Dr. Fox.  Her symptoms have improved, and she has no swallowing issues. Patient returns for follow-up on 11/10/2022.  She states that she has had another recent flareup of diverticulitis with left lower quadrant pain and loose stools.  She was placed on Cipro and Flagyl by her primary care physician, and states that her bowel movements are more normal, but her left lower quadrant pain has persisted.  In addition she has had a flareup of her reflux.  Pantoprazole 40 mg p.o. every morning was added to her regimen which included famotidine 40 mg at bedtime.  She has been elevating the head of her bed.  She states that her reflux has also persisted, she is having no swallowing issues.  She denies any overt bleeding, has had no unexplained weight loss. Patient underwent lab work on 12/12/2022 which included CBC with hematocrit 35.9 hemoglobin 11.1 WBC 8.4 and normal iron studies.  She underwent abdominal pelvic CT scan on 12/20/2022 which showed extensive diverticulosis, but resolution of the previously seen acute diverticulitis. Patient returns for follow-up on 1/12/2023.  Overall she is doing well from a GI standpoint, and having no significant abdominal pain at this time.  We reviewed the results of her recent abdominal pelvic CT scan, which showed extensive diverticulosis, but resolution of the previously seen acute diverticulitis.  Her bowel movements have been essentially normal.  Her reflux has been under good control on daily famotidine therapy, she is discontinued the pantoprazole.  She is taking Benefiber.  She is having no swallowing issues.  She did eat a bowl of blueberries which cause mild abdominal discomfort, but this has resolved.  We again discussed in detail our goal of medically treating her diverticulitis, as she would be a difficult surgical candidate with extensive diverticulosis, and significant adhesions due to previous multiple abdominal surgeries. Patient returns for follow-up on 10/5/2023.  Since her last visit she has had no episodes of diverticulitis, and will occasionally have mild abdominal discomfort overall her bowel movements have been normal she has noticed an increase in her reflux symptoms and that she has had some coughing after meals and occasionally dry food will stick in her throat she denies any actual ongoing dysphagia to solid foods, and she does experience allergies.  She has had no unusual weight loss, no overt GI bleeding and overall has been feeling well from a GI standpoint. In February 2024 patient experienced left lower quadrant abdominal pain she was given a course of antibiotics which did not seem to help, and was evaluated in the emergency room at 2/12/2024 she underwent abdominal pelvic CT scan which showed extensive fecal loading and extensive sigmoid diverticulosis, but no inflammation or evidence of a diverticulitis. Patient returns for follow-up on 4/18/2024.  Since her visit to the emergency room for left lower quadrant abdominal pain in February, she has been feeling well she is having no further abdominal pain, eating well with a good appetite, and her reflux has been under good control with daily pantoprazole therapy.  She is having no swallowing issues.  She states she has not been constipated, and having more normal bowel movements.  We discussed whether she needed a follow-up colonoscopy, as she had a colonoscopy in 2019 showing only diverticulosis with no history of polyps, we do not need to schedule follow-up colonoscopy at this time. Patient is undergone abdominal pelvic CT scan on 8/30/2024. Patient returns for follow-up on 9/12/2024. Overall she has been feeling well from a GI standpoint. She has been able to discontinue the pantoprazole as recommended by her primary care physician about 2 months ago, and is having no significant heartburn or reflux.  She continues to take famotidine 40 mg at bedtime.  She is having no swallowing issues.  She states that when she lies on her left side to sleep, it will wake her up and she has to lie on her right side.  This pain is actually more located at the region of her left hip.  Her bowel movements have been essentially normal, and she has had no recent episodes of diverticulitis.  She did undergo an abdominal pelvic CT scan on 8/30/2024 which showed a normal-appearing liver a small periumbilical hernia scattered diverticulosis with no acute abnormalities. Patient returns for follow-up on 10/8/2024 .  She states that about 3 weeks ago she experienced significant rectal fullness and discomfort, accompanied by constipation.  She described the pain as if someone got a hold of her guts and were twisting.  She gave herself a fleets enema, bowel movement resulted and she felt better.  Overall she has been experiencing chronic constipation when taking Linzess she would have more normal bowel movements, but was only using Linzess every few days.  She has had no recent episodes of diverticulitis.  She does drink fluids, but needs to increase her intake.  She denies any overt bleeding, and at present denies abdominal pain.  Her heartburn and reflux have been under good control on daily pantoprazole therapy and also famotidine 40 mg at bedtime she has been taking dicyclomine 20 mg 3 times daily which has helped abdominal discomfort.  She is having no swallowing issues at present, and denies any overt GI bleeding.  Her most recent abdominal pelvic CT scan done 8/30/2024 showed a normal-appearing liver a small umbilical hernia with scattered diverticulosis and no acute abnormalities. Patient returns for follow-up on 1/16/2025.  Overall she has been doing very well from a GI standpoint.  Her bowel movements are now essentially normal, and she has not needed either Linzess or MiraLAX.  She is increased her consumption of vegetables, and has been drinking more fluid.  She denies any abdominal pain.  Her heartburn and reflux have been well-controlled on nightly famotidine therapy, and she will take pantoprazole 40 mg on an as-needed basis.  She is having no significant swallowing issues.  There has been no recent episodes of diverticulitis.

## 2025-01-21 ENCOUNTER — P2P PATIENT RECORD (OUTPATIENT)
Age: 70
End: 2025-01-21

## 2025-03-18 ENCOUNTER — OFFICE VISIT (OUTPATIENT)
Dept: URBAN - METROPOLITAN AREA CLINIC 40 | Facility: CLINIC | Age: 70
End: 2025-03-18

## 2025-07-17 ENCOUNTER — OFFICE VISIT (OUTPATIENT)
Dept: URBAN - METROPOLITAN AREA CLINIC 40 | Facility: CLINIC | Age: 70
End: 2025-07-17

## 2025-07-29 ENCOUNTER — OFFICE VISIT (OUTPATIENT)
Dept: URBAN - METROPOLITAN AREA CLINIC 40 | Facility: CLINIC | Age: 70
End: 2025-07-29
Payer: MEDICARE

## 2025-07-29 DIAGNOSIS — Z87.19 HISTORY OF DIVERTICULITIS OF COLON: ICD-10-CM

## 2025-07-29 DIAGNOSIS — K21.9 GASTROESOPHAGEAL REFLUX DISEASE, UNSPECIFIED WHETHER ESOPHAGITIS PRESENT: ICD-10-CM

## 2025-07-29 DIAGNOSIS — K59.09 CHRONIC CONSTIPATION: ICD-10-CM

## 2025-07-29 PROCEDURE — 99213 OFFICE O/P EST LOW 20 MIN: CPT | Performed by: INTERNAL MEDICINE

## 2025-07-29 RX ORDER — FAMOTIDINE 40 MG/1
TAKE 1 TABLET BY MOUTH EVERY DAY AT BEDTIME TABLET, FILM COATED ORAL
Qty: 90 TABLET | Refills: 3 | Status: ACTIVE | COMMUNITY

## 2025-07-29 RX ORDER — CIPROFLOXACIN HYDROCHLORIDE 500 MG/1
TAKE 1 TABLET (500 MG) BY ORAL ROUTE EVERY 12 HOURS FOR 10 DAYS TABLET, FILM COATED ORAL 2
Qty: 20 | Refills: 1 | Status: ON HOLD | COMMUNITY
Start: 2020-04-30

## 2025-07-29 RX ORDER — TRAZODONE HYDROCHLORIDE 100 MG/1
1 TABLET AT BEDTIME TABLET ORAL ONCE A DAY
Status: ACTIVE | COMMUNITY

## 2025-07-29 RX ORDER — METRONIDAZOLE 500 MG/1
TAKE 1 TABLET (500 MG) BY ORAL ROUTE 3 TIMES PER DAY FOR 10 DAYS TABLET ORAL
Qty: 30 | Refills: 1 | Status: ON HOLD | COMMUNITY
Start: 2020-04-30

## 2025-07-29 RX ORDER — PANTOPRAZOLE SODIUM 40 MG/1
1 TABLET TABLET, DELAYED RELEASE ORAL ONCE A DAY
Qty: 90 TABLET | Refills: 3 | Status: ACTIVE | COMMUNITY

## 2025-07-29 RX ORDER — PANTOPRAZOLE SODIUM 40 MG/1
1 TABLET TABLET, DELAYED RELEASE ORAL ONCE A DAY
Qty: 90 TABLET | Refills: 3 | OUTPATIENT
Start: 2025-07-26

## 2025-07-29 RX ORDER — CETIRIZINE HYDROCHLORIDE 10 MG/1
1 TABLET TABLET, FILM COATED ORAL ONCE A DAY
Status: ACTIVE | COMMUNITY

## 2025-07-29 RX ORDER — DICYCLOMINE HYDROCHLORIDE 20 MG/1
1 TABLET TABLET ORAL THREE TIMES A DAY
Qty: 270 TABLET | Refills: 3 | Status: ACTIVE | COMMUNITY

## 2025-07-29 RX ORDER — DICYCLOMINE HYDROCHLORIDE 20 MG/1
1 TABLET TABLET ORAL TWICE A DAY
Qty: 180 TABLET | Refills: 3 | OUTPATIENT
Start: 2025-07-26

## 2025-07-29 RX ORDER — DICYCLOMINE HYDROCHLORIDE 20 MG/1
1 TABLET TABLET ORAL THREE TIMES A DAY
Qty: 270 TABLET | Refills: 3 | Status: ON HOLD | COMMUNITY

## 2025-07-29 RX ORDER — IBUPROFEN 800 MG/1
1 TABLET WITH FOOD OR MILK AS NEEDED TABLET, FILM COATED ORAL
Status: ACTIVE | COMMUNITY

## 2025-07-29 RX ORDER — PROMETHAZINE HYDROCHLORIDE 25 MG/ML
1 ML AS NEEDED INJECTION INTRAMUSCULAR; INTRAVENOUS
Status: ON HOLD | COMMUNITY

## 2025-07-29 RX ORDER — LOSARTAN POTASSIUM 100 MG/1
1 TABLET TABLET ORAL ONCE A DAY
Status: ACTIVE | COMMUNITY

## 2025-07-29 RX ORDER — LINACLOTIDE 145 UG/1
1 CAPSULE AT LEAST 30 MINUTES BEFORE THE FIRST MEAL OF THE DAY ON AN EMPTY STOMACH CAPSULE, GELATIN COATED ORAL ONCE A DAY
Qty: 90 | Refills: 3 | Status: ACTIVE | COMMUNITY

## 2025-07-29 RX ORDER — FLUTICASONE PROPIONATE 50 UG/1
SPRAY, METERED NASAL
Qty: 0 | Refills: 0 | Status: ACTIVE | COMMUNITY
Start: 1900-01-01

## 2025-07-29 RX ORDER — LINACLOTIDE 145 UG/1
1 CAPSULE AT LEAST 30 MINUTES BEFORE THE FIRST MEAL OF THE DAY ON AN EMPTY STOMACH CAPSULE, GELATIN COATED ORAL ONCE A DAY
Qty: 90 | Refills: 3 | OUTPATIENT
Start: 2025-07-26 | End: 2026-07-21

## 2025-07-29 RX ORDER — HYDROCHLOROTHIAZIDE 25 MG/1
1 TABLET IN THE MORNING TABLET ORAL ONCE A DAY
Status: ACTIVE | COMMUNITY

## 2025-07-29 RX ORDER — ESZOPICLONE 2 MG/1
1 TABLET IMMEDIATELY BEFORE BEDTIME TABLET, FILM COATED ORAL ONCE A DAY
Status: ACTIVE | COMMUNITY

## 2025-07-29 NOTE — HPI-TODAY'S VISIT:
Ms. Rand is a 69 year old Black female who returns to office for follow up. She has a history of recurrent diverticulitis.  She has had a history of multiple abdominal surgeries, with lysis of adhesions.  She underwent previous colonoscopy on 1/14/2019 which showed multiple sigmoid diverticuli, and was otherwise normal. Multiple rounds of Cipro and Flagyl in the past. She has asked to avoid surgery if at all possible. Patient has had a recent flareup of diverticulitis, and underwent abdominal pelvic CT scan on 8/12/2022 findings are consistent with an acute diverticulitis involving the distal descending colon, proximal sigmoid colon. She also has gastroesophageal reflux, and was begun on famotidine 40 mg p.o. nightly by Dr. Fox.  Her symptoms have improved, and she has no swallowing issues. Pantoprazole 40 mg p.o. every morning was added to her regimen which included famotidine 40 mg at bedtime. She underwent abdominal pelvic CT scan on 12/20/2022 which showed extensive diverticulosis, but resolution of the previously seen acute diverticulitis. Patient was evaluated in the emergency room at 2/12/2024 she underwent abdominal pelvic CT scan which showed extensive fecal loading and extensive sigmoid diverticulosis, but no inflammation or evidence of a diverticulitis. Patient admits to recent diarrhea of uncertain etiology which did resolve.  This was followed by constipation.  With this, her left lower quadrant pain has returned.  No nausea, vomiting, fever or chills.  No rectal bleeding.  Denies changes in her medications.  No recent imaging.  She is feeling a little better, trying to eat a low residue diet but admits she ate a salad today which does cause some of GI upset.  No current use of pain medications.  No regular NSAID use.  Ibuprofen only as needed.  She states that she was recently able to come off of pantoprazole cording to her PCP as her reflux has improved.  However, she continues to take famotidine.  Stable today during visit.  Good appetite weight stable overall.  She plans to go on vacation with her  to late in the near.  She is also planning cataract surgery sometime in September. Patient was last seen in our office on 4/30/2020.  She has had a history of recurrent diverticulitis.  She has had a history of multiple abdominal surgeries, with lysis of adhesions.  When last evaluated she was experiencing sudden onset of left lower quadrant abdominal pain.  She was evaluated in the emergency room, underwent abdominal pelvic CT scan showing an umbilical hernia, fatty liver diverticulosis without obvious inflammation.  Due to the persistence of the pain she was treated with a course of Cipro and Flagyl.  She underwent previous colonoscopy on 1/14/2019 which showed multiple sigmoid diverticuli, and was otherwise normal. Patient presented to her primary care physician on 11/1/2021 with left lower quadrant pain consistent with a flareup of her diverticulitis.  She was given a course of Cipro and Flagyl, and referred back to our practice for follow-up. Patient returns for follow-up on 11/9/2021.  She experienced a recent flareup of her diverticulitis, and was given a 10-day course of Cipro and Flagyl by her primary care physician.  She started feeling better after 3 days.  She is now feeling well with essentially no abdominal pain.  She does have mild constipation, and we advised her to use MiraLAX as needed.  She does drink plenty of fluids.  She wishes to avoid surgery if at all possible.  We discussed that since this was the first real flareup in over a year, avoiding surgery seems reasonable, especially as she does have significant adhesions due to previous multiple abdominal surgeries.  I advised her to avoid constipation, and to call us with the early onset of the symptoms, so antibiotic therapy can be started in a timely fashion.  As she had her last colonoscopy on 1/4/2019, showing multiple sigmoid diverticuli, we do not need to repeat colonoscopy at this time. Patient has had a recent flareup of diverticulitis, and underwent abdominal pelvic CT scan on 8/12/2022 findings are consistent with an acute diverticulitis involving the distal descending colon, proximal sigmoid colon Patient returns for follow-up on 8/30/2022.  Several weeks ago she experienced another flareup of diverticulitis with left lower quadrant abdominal pain.  She underwent abdominal pelvic CT scan which was consistent with an acute diverticulitis involving the distal descending colon and proximal sigmoid colon.  She was given a 10-day course of Cipro and Flagyl, and her symptoms have completely resolved.  She is having no further abdominal pain, and her bowel movements are now normal.  She denies any overt bleeding, has had no unexplained weight loss.  She believes she was mildly constipated prior to this episode, and I again emphasized that she would benefit by beginning MiraLAX when she notices the onset of even a mild constipation.  She does drink plenty of fluids.  She has had mild gastroesophageal reflux, and was begun on famotidine 40 mg p.o. nightly by Dr. Fox.  Her symptoms have improved, and she has no swallowing issues. Patient returns for follow-up on 11/10/2022.  She states that she has had another recent flareup of diverticulitis with left lower quadrant pain and loose stools.  She was placed on Cipro and Flagyl by her primary care physician, and states that her bowel movements are more normal, but her left lower quadrant pain has persisted.  In addition she has had a flareup of her reflux.  Pantoprazole 40 mg p.o. every morning was added to her regimen which included famotidine 40 mg at bedtime.  She has been elevating the head of her bed.  She states that her reflux has also persisted, she is having no swallowing issues.  She denies any overt bleeding, has had no unexplained weight loss. Patient underwent lab work on 12/12/2022 which included CBC with hematocrit 35.9 hemoglobin 11.1 WBC 8.4 and normal iron studies.  She underwent abdominal pelvic CT scan on 12/20/2022 which showed extensive diverticulosis, but resolution of the previously seen acute diverticulitis. Patient returns for follow-up on 1/12/2023.  Overall she is doing well from a GI standpoint, and having no significant abdominal pain at this time.  We reviewed the results of her recent abdominal pelvic CT scan, which showed extensive diverticulosis, but resolution of the previously seen acute diverticulitis.  Her bowel movements have been essentially normal.  Her reflux has been under good control on daily famotidine therapy, she is discontinued the pantoprazole.  She is taking Benefiber.  She is having no swallowing issues.  She did eat a bowl of blueberries which cause mild abdominal discomfort, but this has resolved.  We again discussed in detail our goal of medically treating her diverticulitis, as she would be a difficult surgical candidate with extensive diverticulosis, and significant adhesions due to previous multiple abdominal surgeries. Patient returns for follow-up on 10/5/2023.  Since her last visit she has had no episodes of diverticulitis, and will occasionally have mild abdominal discomfort overall her bowel movements have been normal she has noticed an increase in her reflux symptoms and that she has had some coughing after meals and occasionally dry food will stick in her throat she denies any actual ongoing dysphagia to solid foods, and she does experience allergies.  She has had no unusual weight loss, no overt GI bleeding and overall has been feeling well from a GI standpoint. In February 2024 patient experienced left lower quadrant abdominal pain she was given a course of antibiotics which did not seem to help, and was evaluated in the emergency room at 2/12/2024 she underwent abdominal pelvic CT scan which showed extensive fecal loading and extensive sigmoid diverticulosis, but no inflammation or evidence of a diverticulitis. Patient returns for follow-up on 4/18/2024.  Since her visit to the emergency room for left lower quadrant abdominal pain in February, she has been feeling well she is having no further abdominal pain, eating well with a good appetite, and her reflux has been under good control with daily pantoprazole therapy.  She is having no swallowing issues.  She states she has not been constipated, and having more normal bowel movements.  We discussed whether she needed a follow-up colonoscopy, as she had a colonoscopy in 2019 showing only diverticulosis with no history of polyps, we do not need to schedule follow-up colonoscopy at this time. Patient is undergone abdominal pelvic CT scan on 8/30/2024. Patient returns for follow-up on 9/12/2024. Overall she has been feeling well from a GI standpoint. She has been able to discontinue the pantoprazole as recommended by her primary care physician about 2 months ago, and is having no significant heartburn or reflux.  She continues to take famotidine 40 mg at bedtime.  She is having no swallowing issues.  She states that when she lies on her left side to sleep, it will wake her up and she has to lie on her right side.  This pain is actually more located at the region of her left hip.  Her bowel movements have been essentially normal, and she has had no recent episodes of diverticulitis.  She did undergo an abdominal pelvic CT scan on 8/30/2024 which showed a normal-appearing liver a small periumbilical hernia scattered diverticulosis with no acute abnormalities. Patient returns for follow-up on 10/8/2024 .  She states that about 3 weeks ago she experienced significant rectal fullness and discomfort, accompanied by constipation.  She described the pain as if someone got a hold of her guts and were twisting.  She gave herself a fleets enema, bowel movement resulted and she felt better.  Overall she has been experiencing chronic constipation when taking Linzess she would have more normal bowel movements, but was only using Linzess every few days.  She has had no recent episodes of diverticulitis.  She does drink fluids, but needs to increase her intake.  She denies any overt bleeding, and at present denies abdominal pain.  Her heartburn and reflux have been under good control on daily pantoprazole therapy and also famotidine 40 mg at bedtime she has been taking dicyclomine 20 mg 3 times daily which has helped abdominal discomfort.  She is having no swallowing issues at present, and denies any overt GI bleeding.  Her most recent abdominal pelvic CT scan done 8/30/2024 showed a normal-appearing liver a small umbilical hernia with scattered diverticulosis and no acute abnormalities. Patient returns for follow-up on 1/16/2025.  Overall she has been doing very well from a GI standpoint.  Her bowel movements are now essentially normal, and she has not needed either Linzess or MiraLAX.  She is increased her consumption of vegetables, and has been drinking more fluid.  She denies any abdominal pain.  Her heartburn and reflux have been well-controlled on nightly famotidine therapy, and she will take pantoprazole 40 mg on an as-needed basis.  She is having no significant swallowing issues.  There has been no recent episodes of diverticulitis. Patient returns for follow-up on 7/29/2025.  Overall she has been feeling very well from a GI standpoint.  She has noticed a mild increase in her heartburn and reflux, especially when eating spicy food in the evening.  She has been taking famotidine 40 mg at bedtime, and pantoprazole as needed I advised her to add pantoprazole 40 mg before dinner, for at least 2 weeks, and if her symptoms subside she may decrease the pantoprazole to every other day then taper slowly.  She is having no swallowing issues, and denies abdominal pain she has been taking dicyclomine 20 mg twice daily.  She did undergo colonoscopy in 2019 where sigmoid diverticulosis was found.  We will discuss follow-up surveillance colonoscopy at her next visit.